# Patient Record
Sex: MALE | Race: BLACK OR AFRICAN AMERICAN | Employment: FULL TIME | URBAN - NONMETROPOLITAN AREA
[De-identification: names, ages, dates, MRNs, and addresses within clinical notes are randomized per-mention and may not be internally consistent; named-entity substitution may affect disease eponyms.]

---

## 2017-05-12 ENCOUNTER — NURSE TRIAGE (OUTPATIENT)
Dept: ADMINISTRATIVE | Age: 47
End: 2017-05-12

## 2017-07-17 ENCOUNTER — APPOINTMENT (OUTPATIENT)
Dept: CT IMAGING | Age: 47
End: 2017-07-17
Payer: COMMERCIAL

## 2017-07-17 ENCOUNTER — HOSPITAL ENCOUNTER (EMERGENCY)
Age: 47
Discharge: HOME | End: 2017-07-17
Payer: COMMERCIAL

## 2017-07-17 VITALS
HEART RATE: 79 BPM | BODY MASS INDEX: 43.65 KG/M2 | DIASTOLIC BLOOD PRESSURE: 66 MMHG | WEIGHT: 262 LBS | OXYGEN SATURATION: 100 % | RESPIRATION RATE: 18 BRPM | HEIGHT: 65 IN | SYSTOLIC BLOOD PRESSURE: 128 MMHG | TEMPERATURE: 98.7 F

## 2017-07-17 DIAGNOSIS — N20.0 RIGHT NEPHROLITHIASIS: Primary | ICD-10-CM

## 2017-07-17 LAB
ALBUMIN SERPL-MCNC: 4.3 G/DL (ref 3.5–5.1)
ALP BLD-CCNC: 62 U/L (ref 38–126)
ALT SERPL-CCNC: 43 U/L (ref 11–66)
AMYLASE: 203 U/L (ref 20–104)
ANION GAP SERPL CALCULATED.3IONS-SCNC: 12 MEQ/L (ref 8–16)
AST SERPL-CCNC: 21 U/L (ref 5–40)
BACTERIA: ABNORMAL /HPF
BASOPHILS # BLD: 0.4 %
BASOPHILS ABSOLUTE: 0 THOU/MM3 (ref 0–0.1)
BILIRUB SERPL-MCNC: 0.5 MG/DL (ref 0.3–1.2)
BILIRUBIN URINE: NEGATIVE
BLOOD, URINE: ABNORMAL
BUN BLDV-MCNC: 14 MG/DL (ref 7–22)
CALCIUM SERPL-MCNC: 9.2 MG/DL (ref 8.5–10.5)
CASTS 2: ABNORMAL /LPF
CASTS UA: ABNORMAL /LPF
CHARACTER, URINE: ABNORMAL
CHLORIDE BLD-SCNC: 103 MEQ/L (ref 98–111)
CO2: 25 MEQ/L (ref 23–33)
COLOR: ABNORMAL
CREAT SERPL-MCNC: 1.2 MG/DL (ref 0.4–1.2)
CRYSTALS, UA: ABNORMAL
EOSINOPHIL # BLD: 1.6 %
EOSINOPHILS ABSOLUTE: 0.1 THOU/MM3 (ref 0–0.4)
EPITHELIAL CELLS, UA: ABNORMAL /HPF
GFR SERPL CREATININE-BSD FRML MDRD: 78 ML/MIN/1.73M2
GLUCOSE BLD-MCNC: 132 MG/DL (ref 70–108)
GLUCOSE URINE: NEGATIVE MG/DL
HCT VFR BLD CALC: 46.8 % (ref 42–52)
HEMOGLOBIN: 15.8 GM/DL (ref 14–18)
KETONES, URINE: NEGATIVE
LEUKOCYTE ESTERASE, URINE: NEGATIVE
LIPASE: 28.5 U/L (ref 5.6–51.3)
LYMPHOCYTES # BLD: 40 %
LYMPHOCYTES ABSOLUTE: 2.2 THOU/MM3 (ref 1–4.8)
MCH RBC QN AUTO: 32.7 PG (ref 27–31)
MCHC RBC AUTO-ENTMCNC: 33.7 GM/DL (ref 33–37)
MCV RBC AUTO: 97.2 FL (ref 80–94)
MISCELLANEOUS 2: ABNORMAL
MONOCYTES # BLD: 8.5 %
MONOCYTES ABSOLUTE: 0.5 THOU/MM3 (ref 0.4–1.3)
NITRITE, URINE: NEGATIVE
NUCLEATED RED BLOOD CELLS: 0 /100 WBC
PDW BLD-RTO: 13.5 % (ref 11.5–14.5)
PH UA: 5.5
PLATELET # BLD: 317 THOU/MM3 (ref 130–400)
PMV BLD AUTO: 7.1 MCM (ref 7.4–10.4)
POTASSIUM SERPL-SCNC: 4.7 MEQ/L (ref 3.5–5.2)
PROTEIN UA: ABNORMAL
RBC # BLD: 4.82 MILL/MM3 (ref 4.7–6.1)
RBC # BLD: NORMAL 10*6/UL
RBC URINE: > 200 /HPF
RENAL EPITHELIAL, UA: ABNORMAL
SEG NEUTROPHILS: 49.5 %
SEGMENTED NEUTROPHILS ABSOLUTE COUNT: 2.8 THOU/MM3 (ref 1.8–7.7)
SODIUM BLD-SCNC: 140 MEQ/L (ref 135–145)
SPECIFIC GRAVITY, URINE: 1.02 (ref 1–1.03)
TOTAL PROTEIN: 7.6 G/DL (ref 6.1–8)
TROPONIN T: < 0.01 NG/ML
UROBILINOGEN, URINE: 0.2 EU/DL
WBC # BLD: 5.6 THOU/MM3 (ref 4.8–10.8)
WBC UA: ABNORMAL /HPF
YEAST: ABNORMAL

## 2017-07-17 PROCEDURE — 84484 ASSAY OF TROPONIN QUANT: CPT

## 2017-07-17 PROCEDURE — 74178 CT ABD&PLV WO CNTR FLWD CNTR: CPT

## 2017-07-17 PROCEDURE — 81001 URINALYSIS AUTO W/SCOPE: CPT

## 2017-07-17 PROCEDURE — 96375 TX/PRO/DX INJ NEW DRUG ADDON: CPT

## 2017-07-17 PROCEDURE — 80053 COMPREHEN METABOLIC PANEL: CPT

## 2017-07-17 PROCEDURE — 93005 ELECTROCARDIOGRAM TRACING: CPT

## 2017-07-17 PROCEDURE — 96374 THER/PROPH/DIAG INJ IV PUSH: CPT

## 2017-07-17 PROCEDURE — 85025 COMPLETE CBC W/AUTO DIFF WBC: CPT

## 2017-07-17 PROCEDURE — 6360000004 HC RX CONTRAST MEDICATION: Performed by: STUDENT IN AN ORGANIZED HEALTH CARE EDUCATION/TRAINING PROGRAM

## 2017-07-17 PROCEDURE — 6360000002 HC RX W HCPCS: Performed by: STUDENT IN AN ORGANIZED HEALTH CARE EDUCATION/TRAINING PROGRAM

## 2017-07-17 PROCEDURE — 2580000003 HC RX 258: Performed by: STUDENT IN AN ORGANIZED HEALTH CARE EDUCATION/TRAINING PROGRAM

## 2017-07-17 PROCEDURE — 36415 COLL VENOUS BLD VENIPUNCTURE: CPT

## 2017-07-17 PROCEDURE — 99284 EMERGENCY DEPT VISIT MOD MDM: CPT

## 2017-07-17 PROCEDURE — 83690 ASSAY OF LIPASE: CPT

## 2017-07-17 PROCEDURE — 82150 ASSAY OF AMYLASE: CPT

## 2017-07-17 RX ORDER — ONDANSETRON 2 MG/ML
4 INJECTION INTRAMUSCULAR; INTRAVENOUS ONCE
Status: COMPLETED | OUTPATIENT
Start: 2017-07-17 | End: 2017-07-17

## 2017-07-17 RX ORDER — 0.9 % SODIUM CHLORIDE 0.9 %
1000 INTRAVENOUS SOLUTION INTRAVENOUS ONCE
Status: COMPLETED | OUTPATIENT
Start: 2017-07-17 | End: 2017-07-17

## 2017-07-17 RX ORDER — KETOROLAC TROMETHAMINE 30 MG/ML
30 INJECTION, SOLUTION INTRAMUSCULAR; INTRAVENOUS ONCE
Status: COMPLETED | OUTPATIENT
Start: 2017-07-17 | End: 2017-07-17

## 2017-07-17 RX ORDER — HYDROCODONE BITARTRATE AND ACETAMINOPHEN 5; 325 MG/1; MG/1
1 TABLET ORAL EVERY 6 HOURS PRN
Qty: 5 TABLET | Refills: 0 | Status: SHIPPED | OUTPATIENT
Start: 2017-07-17 | End: 2017-07-21

## 2017-07-17 RX ADMIN — IOPAMIDOL 85 ML: 755 INJECTION, SOLUTION INTRAVENOUS at 17:22

## 2017-07-17 RX ADMIN — KETOROLAC TROMETHAMINE 30 MG: 30 INJECTION, SOLUTION INTRAMUSCULAR at 17:45

## 2017-07-17 RX ADMIN — SODIUM CHLORIDE 1000 ML: 9 INJECTION, SOLUTION INTRAVENOUS at 17:46

## 2017-07-17 RX ADMIN — ONDANSETRON 4 MG: 2 INJECTION INTRAMUSCULAR; INTRAVENOUS at 17:45

## 2017-07-17 ASSESSMENT — ENCOUNTER SYMPTOMS
VOMITING: 1
EYE REDNESS: 0
WHEEZING: 0
BACK PAIN: 1
DIARRHEA: 0
EYE DISCHARGE: 0
SORE THROAT: 0
COUGH: 0
SHORTNESS OF BREATH: 0
NAUSEA: 1
ABDOMINAL PAIN: 1
RHINORRHEA: 0

## 2017-07-17 ASSESSMENT — PAIN DESCRIPTION - ORIENTATION
ORIENTATION: RIGHT;UPPER
ORIENTATION: RIGHT;UPPER

## 2017-07-17 ASSESSMENT — PAIN SCALES - GENERAL
PAINLEVEL_OUTOF10: 10
PAINLEVEL_OUTOF10: 10
PAINLEVEL_OUTOF10: 0

## 2017-07-17 ASSESSMENT — PAIN DESCRIPTION - DESCRIPTORS
DESCRIPTORS: SHARP
DESCRIPTORS: SHARP

## 2017-07-17 ASSESSMENT — PAIN DESCRIPTION - FREQUENCY
FREQUENCY: CONTINUOUS
FREQUENCY: CONTINUOUS

## 2017-07-17 ASSESSMENT — PAIN DESCRIPTION - PAIN TYPE
TYPE: ACUTE PAIN
TYPE: ACUTE PAIN

## 2017-07-17 ASSESSMENT — PAIN DESCRIPTION - LOCATION
LOCATION: ABDOMEN
LOCATION: ABDOMEN

## 2017-07-18 ENCOUNTER — OFFICE VISIT (OUTPATIENT)
Dept: UROLOGY | Age: 47
End: 2017-07-18
Payer: COMMERCIAL

## 2017-07-18 ENCOUNTER — TELEPHONE (OUTPATIENT)
Dept: UROLOGY | Age: 47
End: 2017-07-18

## 2017-07-18 VITALS
BODY MASS INDEX: 43.71 KG/M2 | SYSTOLIC BLOOD PRESSURE: 118 MMHG | WEIGHT: 256 LBS | HEIGHT: 64 IN | DIASTOLIC BLOOD PRESSURE: 78 MMHG

## 2017-07-18 DIAGNOSIS — N20.1 RIGHT URETERAL STONE: Primary | ICD-10-CM

## 2017-07-18 DIAGNOSIS — N13.30 HYDRONEPHROSIS, RIGHT: ICD-10-CM

## 2017-07-18 PROCEDURE — 99203 OFFICE O/P NEW LOW 30 MIN: CPT | Performed by: NURSE PRACTITIONER

## 2017-07-18 RX ORDER — TAMSULOSIN HYDROCHLORIDE 0.4 MG/1
0.4 CAPSULE ORAL DAILY
Qty: 30 CAPSULE | Refills: 3 | Status: SHIPPED | OUTPATIENT
Start: 2017-07-18 | End: 2020-01-14

## 2017-07-18 RX ORDER — KETOROLAC TROMETHAMINE 10 MG/1
10 TABLET, FILM COATED ORAL EVERY 6 HOURS PRN
Qty: 20 TABLET | Refills: 0 | Status: SHIPPED | OUTPATIENT
Start: 2017-07-18 | End: 2020-01-14

## 2017-07-18 RX ORDER — HYDROCODONE BITARTRATE AND ACETAMINOPHEN 5; 325 MG/1; MG/1
1 TABLET ORAL EVERY 4 HOURS PRN
Qty: 15 TABLET | Refills: 0 | Status: SHIPPED | OUTPATIENT
Start: 2017-07-18 | End: 2017-07-21

## 2017-07-19 LAB
EKG ATRIAL RATE: 75 BPM
EKG P AXIS: 57 DEGREES
EKG P-R INTERVAL: 154 MS
EKG Q-T INTERVAL: 382 MS
EKG QRS DURATION: 84 MS
EKG QTC CALCULATION (BAZETT): 426 MS
EKG R AXIS: -21 DEGREES
EKG T AXIS: 14 DEGREES
EKG VENTRICULAR RATE: 75 BPM

## 2017-07-20 ENCOUNTER — NURSE TRIAGE (OUTPATIENT)
Dept: ADMINISTRATIVE | Age: 47
End: 2017-07-20

## 2017-07-20 ENCOUNTER — HOSPITAL ENCOUNTER (EMERGENCY)
Age: 47
Discharge: HOME | End: 2017-07-20
Attending: EMERGENCY MEDICINE
Payer: COMMERCIAL

## 2017-07-20 ENCOUNTER — TELEPHONE (OUTPATIENT)
Dept: UROLOGY | Age: 47
End: 2017-07-20

## 2017-07-20 VITALS
HEIGHT: 65 IN | HEART RATE: 78 BPM | BODY MASS INDEX: 42.99 KG/M2 | SYSTOLIC BLOOD PRESSURE: 156 MMHG | OXYGEN SATURATION: 95 % | RESPIRATION RATE: 20 BRPM | TEMPERATURE: 98.2 F | WEIGHT: 258 LBS | DIASTOLIC BLOOD PRESSURE: 85 MMHG

## 2017-07-20 DIAGNOSIS — N20.0 KIDNEY STONE: ICD-10-CM

## 2017-07-20 DIAGNOSIS — N23 RENAL COLIC: Primary | ICD-10-CM

## 2017-07-20 LAB
BACTERIA: ABNORMAL /HPF
BILIRUBIN URINE: NEGATIVE
BLOOD, URINE: ABNORMAL
CASTS 2: ABNORMAL /LPF
CASTS UA: ABNORMAL /LPF
CHARACTER, URINE: CLEAR
COLOR: YELLOW
CRYSTALS, UA: ABNORMAL
EPITHELIAL CELLS, UA: ABNORMAL /HPF
GLUCOSE URINE: NEGATIVE MG/DL
KETONES, URINE: NEGATIVE
LEUKOCYTE ESTERASE, URINE: NEGATIVE
MISCELLANEOUS 2: ABNORMAL
NITRITE, URINE: NEGATIVE
PH UA: 6
PROTEIN UA: NEGATIVE
RBC URINE: ABNORMAL /HPF
RENAL EPITHELIAL, UA: ABNORMAL
SPECIFIC GRAVITY, URINE: 1.01 (ref 1–1.03)
UROBILINOGEN, URINE: 0.2 EU/DL
WBC UA: ABNORMAL /HPF
YEAST: ABNORMAL

## 2017-07-20 PROCEDURE — 96375 TX/PRO/DX INJ NEW DRUG ADDON: CPT

## 2017-07-20 PROCEDURE — 99283 EMERGENCY DEPT VISIT LOW MDM: CPT

## 2017-07-20 PROCEDURE — 6360000002 HC RX W HCPCS: Performed by: EMERGENCY MEDICINE

## 2017-07-20 PROCEDURE — 96361 HYDRATE IV INFUSION ADD-ON: CPT

## 2017-07-20 PROCEDURE — 81001 URINALYSIS AUTO W/SCOPE: CPT

## 2017-07-20 PROCEDURE — 96374 THER/PROPH/DIAG INJ IV PUSH: CPT

## 2017-07-20 PROCEDURE — 2580000003 HC RX 258: Performed by: EMERGENCY MEDICINE

## 2017-07-20 RX ORDER — TIZANIDINE 4 MG/1
4 TABLET ORAL EVERY 6 HOURS PRN
COMMUNITY
End: 2020-01-14

## 2017-07-20 RX ORDER — ONDANSETRON 2 MG/ML
4 INJECTION INTRAMUSCULAR; INTRAVENOUS ONCE
Status: COMPLETED | OUTPATIENT
Start: 2017-07-20 | End: 2017-07-20

## 2017-07-20 RX ORDER — SIMVASTATIN 40 MG
40 TABLET ORAL NIGHTLY
COMMUNITY
End: 2020-01-14

## 2017-07-20 RX ORDER — SODIUM CHLORIDE 9 MG/ML
INJECTION, SOLUTION INTRAVENOUS CONTINUOUS
Status: DISCONTINUED | OUTPATIENT
Start: 2017-07-20 | End: 2017-07-20 | Stop reason: HOSPADM

## 2017-07-20 RX ORDER — KETOROLAC TROMETHAMINE 30 MG/ML
30 INJECTION, SOLUTION INTRAMUSCULAR; INTRAVENOUS ONCE
Status: COMPLETED | OUTPATIENT
Start: 2017-07-20 | End: 2017-07-20

## 2017-07-20 RX ADMIN — KETOROLAC TROMETHAMINE 30 MG: 30 INJECTION, SOLUTION INTRAMUSCULAR at 05:00

## 2017-07-20 RX ADMIN — SODIUM CHLORIDE: 9 INJECTION, SOLUTION INTRAVENOUS at 05:05

## 2017-07-20 RX ADMIN — ONDANSETRON 4 MG: 2 INJECTION INTRAMUSCULAR; INTRAVENOUS at 05:00

## 2017-07-20 ASSESSMENT — PAIN DESCRIPTION - PAIN TYPE
TYPE: ACUTE PAIN
TYPE: ACUTE PAIN

## 2017-07-20 ASSESSMENT — PAIN DESCRIPTION - PROGRESSION
CLINICAL_PROGRESSION: GRADUALLY IMPROVING
CLINICAL_PROGRESSION: GRADUALLY WORSENING

## 2017-07-20 ASSESSMENT — PAIN DESCRIPTION - FREQUENCY
FREQUENCY: INTERMITTENT
FREQUENCY: CONTINUOUS

## 2017-07-20 ASSESSMENT — PAIN DESCRIPTION - DESCRIPTORS
DESCRIPTORS: CRAMPING
DESCRIPTORS: SHARP

## 2017-07-20 ASSESSMENT — PAIN SCALES - GENERAL
PAINLEVEL_OUTOF10: 10
PAINLEVEL_OUTOF10: 3
PAINLEVEL_OUTOF10: 6

## 2017-07-20 ASSESSMENT — PAIN DESCRIPTION - ONSET: ONSET: ON-GOING

## 2017-07-20 ASSESSMENT — PAIN DESCRIPTION - LOCATION
LOCATION: ABDOMEN
LOCATION: ABDOMEN

## 2017-07-20 ASSESSMENT — PAIN DESCRIPTION - ORIENTATION
ORIENTATION: RIGHT
ORIENTATION: RIGHT

## 2017-07-21 ENCOUNTER — HOSPITAL ENCOUNTER (EMERGENCY)
Age: 47
Discharge: HOME | End: 2017-07-21
Attending: EMERGENCY MEDICINE
Payer: COMMERCIAL

## 2017-07-21 VITALS
HEIGHT: 65 IN | WEIGHT: 258 LBS | BODY MASS INDEX: 42.99 KG/M2 | OXYGEN SATURATION: 95 % | DIASTOLIC BLOOD PRESSURE: 71 MMHG | SYSTOLIC BLOOD PRESSURE: 133 MMHG | TEMPERATURE: 99.2 F | HEART RATE: 78 BPM | RESPIRATION RATE: 16 BRPM

## 2017-07-21 DIAGNOSIS — R19.7 DIARRHEA, UNSPECIFIED TYPE: Primary | ICD-10-CM

## 2017-07-21 DIAGNOSIS — N20.0 KIDNEY STONE: ICD-10-CM

## 2017-07-21 PROCEDURE — 82365 CALCULUS SPECTROSCOPY: CPT

## 2017-07-21 PROCEDURE — 99283 EMERGENCY DEPT VISIT LOW MDM: CPT

## 2017-07-21 RX ORDER — DIPHENOXYLATE HYDROCHLORIDE AND ATROPINE SULFATE 2.5; .025 MG/1; MG/1
1 TABLET ORAL 4 TIMES DAILY PRN
Qty: 12 TABLET | Refills: 0 | Status: SHIPPED | OUTPATIENT
Start: 2017-07-21 | End: 2020-01-14

## 2017-07-21 RX ORDER — ONDANSETRON 4 MG/1
4 TABLET, ORALLY DISINTEGRATING ORAL EVERY 8 HOURS PRN
Qty: 12 TABLET | Refills: 0 | Status: SHIPPED | OUTPATIENT
Start: 2017-07-21 | End: 2017-10-01 | Stop reason: ALTCHOICE

## 2017-07-26 LAB — STONE ANALYSIS: NORMAL

## 2017-09-28 ENCOUNTER — APPOINTMENT (OUTPATIENT)
Dept: GENERAL RADIOLOGY | Age: 47
End: 2017-09-28
Payer: COMMERCIAL

## 2017-09-28 ENCOUNTER — HOSPITAL ENCOUNTER (EMERGENCY)
Age: 47
Discharge: HOME | End: 2017-09-28
Payer: COMMERCIAL

## 2017-09-28 VITALS
RESPIRATION RATE: 18 BRPM | TEMPERATURE: 98.6 F | HEIGHT: 64 IN | WEIGHT: 258 LBS | DIASTOLIC BLOOD PRESSURE: 77 MMHG | SYSTOLIC BLOOD PRESSURE: 133 MMHG | OXYGEN SATURATION: 98 % | BODY MASS INDEX: 44.05 KG/M2 | HEART RATE: 84 BPM

## 2017-09-28 DIAGNOSIS — S61.511A LACERATION OF WRIST, RIGHT, INITIAL ENCOUNTER: Primary | ICD-10-CM

## 2017-09-28 PROCEDURE — 6360000002 HC RX W HCPCS: Performed by: PHYSICIAN ASSISTANT

## 2017-09-28 PROCEDURE — 99283 EMERGENCY DEPT VISIT LOW MDM: CPT

## 2017-09-28 PROCEDURE — A6446 CONFORM BAND S W>=3" <5"/YD: HCPCS

## 2017-09-28 PROCEDURE — 73130 X-RAY EXAM OF HAND: CPT

## 2017-09-28 PROCEDURE — 90471 IMMUNIZATION ADMIN: CPT | Performed by: PHYSICIAN ASSISTANT

## 2017-09-28 PROCEDURE — 73110 X-RAY EXAM OF WRIST: CPT

## 2017-09-28 PROCEDURE — 90715 TDAP VACCINE 7 YRS/> IM: CPT | Performed by: PHYSICIAN ASSISTANT

## 2017-09-28 RX ADMIN — TETANUS TOXOID, REDUCED DIPHTHERIA TOXOID AND ACELLULAR PERTUSSIS VACCINE, ADSORBED 0.5 ML: 5; 2.5; 8; 8; 2.5 SUSPENSION INTRAMUSCULAR at 11:01

## 2017-09-28 ASSESSMENT — ENCOUNTER SYMPTOMS
EYE DISCHARGE: 0
VOMITING: 0
SORE THROAT: 0
NAUSEA: 0
EYE REDNESS: 0
BACK PAIN: 0
RHINORRHEA: 0
WHEEZING: 0
SHORTNESS OF BREATH: 0
DIARRHEA: 0
ABDOMINAL PAIN: 0
COUGH: 0

## 2017-09-28 ASSESSMENT — PAIN DESCRIPTION - ORIENTATION: ORIENTATION: RIGHT

## 2017-09-28 ASSESSMENT — PAIN DESCRIPTION - PAIN TYPE: TYPE: ACUTE PAIN

## 2017-09-28 ASSESSMENT — PAIN SCALES - GENERAL: PAINLEVEL_OUTOF10: 2

## 2017-09-28 ASSESSMENT — PAIN DESCRIPTION - LOCATION: LOCATION: WRIST

## 2017-09-28 NOTE — ED PROVIDER NOTES
patient is not nervous/anxious. PAST MEDICAL HISTORY    has a past medical history of Asthma; High cholesterol; and Kidney stone. SURGICAL HISTORY      has no past surgical history on file. CURRENT MEDICATIONS       Discharge Medication List as of 9/28/2017 11:59 AM      CONTINUE these medications which have NOT CHANGED    Details   diphenoxylate-atropine (LOMOTIL) 2.5-0.025 MG per tablet Take 1 tablet by mouth 4 times daily as needed for Diarrhea, Disp-12 tablet, R-0Print      ondansetron (ZOFRAN ODT) 4 MG disintegrating tablet Take 1 tablet by mouth every 8 hours as needed for Nausea or Vomiting, Disp-12 tablet, R-0Print      simvastatin (ZOCOR) 40 MG tablet Take 40 mg by mouth nightlyHistorical Med      tiZANidine (ZANAFLEX) 4 MG tablet Take 4 mg by mouth every 6 hours as neededHistorical Med      Cholecalciferol (VITAMIN D PO) Take by mouthHistorical Med      tamsulosin (FLOMAX) 0.4 MG capsule Take 1 capsule by mouth daily, Disp-30 capsule, R-3Normal      ketorolac (TORADOL) 10 MG tablet Take 1 tablet by mouth every 6 hours as needed for Pain, Disp-20 tablet, R-0Normal      ibuprofen (ADVIL;MOTRIN) 800 MG tablet Take 1 tablet by mouth every 8 hours as needed for Pain, Disp-30 tablet, R-0      cetirizine (ZYRTEC) 10 MG tablet Take 10 mg by mouth as needed for Allergies             ALLERGIES     has No Known Allergies. FAMILY HISTORY     has no family status information on file. family history is not on file. SOCIAL HISTORY      reports that he is a non-smoker but has been exposed to tobacco smoke. He has never used smokeless tobacco. He reports that he does not drink alcohol or use illicit drugs. PHYSICAL EXAM     INITIAL VITALS:  height is 5' 4\" (1.626 m) and weight is 258 lb (117 kg). His oral temperature is 98.6 °F (37 °C). His blood pressure is 133/77 and his pulse is 84. His respiration is 18 and oxygen saturation is 98%.     Physical Exam   Constitutional: He is oriented to person, place, and time. He appears well-developed and well-nourished. HENT:   Head: Normocephalic and atraumatic. Right Ear: External ear normal.   Left Ear: External ear normal.   Nose: Nose normal.   Mouth/Throat: Oropharynx is clear and moist. No oropharyngeal exudate. Eyes: Conjunctivae are normal. Right eye exhibits no discharge. Left eye exhibits no discharge. No scleral icterus. Neck: Normal range of motion. Neck supple. No JVD present. Cardiovascular: Normal rate, regular rhythm, normal heart sounds and intact distal pulses. Exam reveals no gallop and no friction rub. No murmur heard. Pulmonary/Chest: Effort normal and breath sounds normal. No stridor. No respiratory distress. He has no decreased breath sounds. He has no wheezes. He has no rhonchi. He has no rales. He exhibits no tenderness. Abdominal: Soft. He exhibits no distension. There is no tenderness. There is no rebound and no guarding. Musculoskeletal: Normal range of motion. He exhibits no edema or deformity. Right elbow: Normal.       Right wrist: He exhibits laceration (medial aspect- linear and superficial). He exhibits normal range of motion, no bony tenderness, no swelling and no deformity. Right hand: Normal.   Neurological: He is alert and oriented to person, place, and time. No cranial nerve deficit. He exhibits normal muscle tone. He displays no seizure activity. Coordination normal. GCS eye subscore is 4. GCS verbal subscore is 5. GCS motor subscore is 6. Skin: Skin is warm and dry. No rash noted. He is not diaphoretic. No erythema. 2 1/2 cm superficial laceration on the ulnar side of the right wrist. No gaping or bleeding. Several sub half-centimeter very superficial cuts that are nonbleeding and nontender. Psychiatric: He has a normal mood and affect.  His speech is normal and behavior is normal. Thought content normal. Cognition and memory are normal.   Patient is talking appropriately    Nursing note

## 2017-09-28 NOTE — ED AVS SNAPSHOT
After Visit Summary  (Discharge Instructions)    Medication List for Home    Based on the information you provided to us as well as any changes during this visit, the following is your updated medication list.  Compare this with your prescription bottles at home. If you have any questions or concerns, contact your primary care physician's office. Daily Medication List (This medication list can be shared with any Healthcare provider who is helping you manage your medications)      These are medications you told us you were taking at home, CONTINUE taking them after you leave the hospital     cetirizine 10 MG tablet   Commonly known as:  ZYRTEC   Take 10 mg by mouth as needed for Allergies       diphenoxylate-atropine 2.5-0.025 MG per tablet   Commonly known as:  LOMOTIL   Take 1 tablet by mouth 4 times daily as needed for Diarrhea       ibuprofen 800 MG tablet   Commonly known as:  ADVIL;MOTRIN   Take 1 tablet by mouth every 8 hours as needed for Pain       ketorolac 10 MG tablet   Commonly known as:  TORADOL   Take 1 tablet by mouth every 6 hours as needed for Pain       ondansetron 4 MG disintegrating tablet   Commonly known as:  ZOFRAN ODT   Take 1 tablet by mouth every 8 hours as needed for Nausea or Vomiting       simvastatin 40 MG tablet   Commonly known as:  ZOCOR   Take 40 mg by mouth nightly       tamsulosin 0.4 MG capsule   Commonly known as:  FLOMAX   Take 1 capsule by mouth daily       tiZANidine 4 MG tablet   Commonly known as:  ZANAFLEX   Take 4 mg by mouth every 6 hours as needed       VITAMIN D PO   Take by mouth               Allergies as of 9/28/2017     No Known Allergies      Immunizations as of 9/28/2017     Name Date Dose VIS Date Route    Tdap (Boostrix, Adacel) 9/28/2017 0.5 mL 2/24/2015 Intramuscular         After Visit Summary    This summary was created for you. Thank you for entrusting your care to us.   The following information includes details about your hospital/visit stay along with steps you should take to help with your recovery once you leave the hospital.  In this packet, you will find information about the topics listed below:    · Instructions about your medications including a list of your home medications  · A summary of your hospital visit  · Follow-up appointments once you have left the hospital  · Your care plan at home      You may receive a survey regarding the care you received during your stay. Your input is valuable to us. We encourage you to complete and return your survey in the envelope provided. We hope you will choose us in the future for your healthcare needs. Patient Information     Patient Name SHIRA Sands 1970      Care Provided at:     Name Address Phone       [address] [phone]            Your Visit    Here you will find information about your visit, including the reason for your visit. Please take this sheet with you when you visit your doctor or other health care provider in the future. It will help determine the best possible medical care for you at that time. If you have any questions once you leave the hospital, please call the department phone number listed below. Diagnoses this visit     Your diagnosis was LACERATION OF WRIST, RIGHT, INITIAL ENCOUNTER. Visit Information     Date of Visit Department Dept Phone    2017 [facility] EMERGENCY DEPT [phone]      You were seen by     You were seen by RANDI Brody. Follow-up Appointments    Below is a list of your follow-up and future appointments. This may not be a complete list as you may have made appointments directly with providers that we are not aware of or your providers may have made some for you. Please call your providers to confirm appointments. It is important to keep your appointments. Please bring your current insurance card, photo ID, co-pay, and all medication bottles to your appointment. If self-pay, payment is expected at the time of service. Follow-up Information     Follow up with Buzz Peng In 2 days. Why:  For wound re-check    Contact information:    [address]          Follow up with [facility] [address] [address] EMERGENCY DEPT. Specialty:  Emergency Medicine    Why:  As needed, If symptoms worsen    Contact information:    Matt Becker  [address]  [phone]      Future Appointments     10/5/2017 9:30 AM     Appointment with Mele Graves MD at [phone] ([phone])   [place] [address]  [address]  [place] [address]         Preventive Care        Date Due    HIV screening is recommended for all people regardless of risk factors  aged 15-65 years at least once (lifetime) who have never been HIV tested. 11/15/1984    Cholesterol Screening 11/15/2009    Diabetes Screening 11/15/2009    Yearly Flu Vaccine (1) 9/1/2017    Tetanus Combination Vaccine (2 - Td) 9/28/2027                 Care Plan Once You Return Home    This section includes instructions you will need to follow once you leave the hospital.  Your care team will discuss these with you, so you and those caring for you know how to best care for your health needs at home. This section may also include educational information about certain health topics that may be of help to you. Important Information if you smoke or are exposed to smoking       SMOKING: QUIT SMOKING. THIS IS THE MOST IMPORTANT ACTION YOU CAN TAKE TO IMPROVE YOUR CURRENT AND FUTURE HEALTH. Call the [address] [place] [address] at [place] NOW ([phone])    Smoking harms nonsmokers. When nonsmokers are around people who smoke, they absorb nicotine, carbon monoxide, and other ingredients of tobacco smoke. 5. Create a [employer] ID. This will be your [employer] login ID and cannot be changed, so think of one that is secure and easy to remember. 6. Create a [employer] password. You can change your password at any time. 7. Enter your Password Reset Question and Answer. This can be used at a later time if you forget your password. 8. Enter your e-mail address. You will receive e-mail notification when new information is available in [address] 9. Click Sign Up. You can now view your medical record. Additional Information  If you have questions, please contact the physician practice where you receive care. Remember, [employer] is NOT to be used for urgent needs. For medical emergencies, dial 911. For questions regarding your [employer] account call [phone]. If you have a clinical question, please call your doctor's office. View your information online  ? Review your current list of  medications, immunization, and allergies. ? Review your future test results online . ? Review your discharge instructions provided by your caregivers at discharge    Certain functionality such as prescription refills, scheduling appointments or sending messages to your provider are not activated if your provider does not use [employer] in his/her office    For questions regarding your [employer] account call [phone]. If you have a clinical question, please call your doctor's office. The information on all pages of the After Visit Summary has been reviewed with me, the patient and/or responsible adult, by my health care provider(s). I had the opportunity to ask questions regarding this information. I understand I should dispose of my armband safely at home to protect my health information. A complete copy of the After Visit Summary has been given to me, the patient and/or responsible adult.          Patient Signature/Responsible Adult: ___________________________________ times a day unless your doctor gives you different instructions. ¨ Don't use hydrogen peroxide or alcohol, which can slow healing. ¨ You may cover the cut with a thin layer of petroleum jelly and a nonstick bandage. ¨ If the doctor put a bandage over the cut, put on a new bandage after cleaning the cut or if the bandage gets wet or dirty. · Avoid any activity that could cause your cut to reopen. · Be safe with medicines. Read and follow all instructions on the label. ¨ If the doctor gave you a prescription medicine for pain, take it as prescribed. ¨ If you are not taking a prescription pain medicine, ask your doctor if you can take an over-the-counter medicine. If the cut is closed with stitches, staples, or Steri-Strips  · Follow the above instructions for open or closed cuts. · Do not remove the stitches or staples on your own. Your doctor will tell you when to come back to have the stitches or staples removed. · Leave Steri-Strips on until they fall off. If the cut is closed with a skin adhesive  · Follow the above instructions for open or closed cuts. · Leave the skin adhesive on your skin until it falls off on its own. This may take 5 to 10 days. · Do not scratch, rub, or pick at the adhesive. · Do not put the sticky part of a bandage directly on the adhesive. · Do not put any kind of ointment, cream, or lotion over the area. This can make the adhesive fall off too soon. Do not use hydrogen peroxide or alcohol, which can slow healing. When should you call for help? Call your doctor now or seek immediate medical care if:  · You have new pain, or your pain gets worse. · The skin near the cut is cold or pale or changes color. · You have tingling, weakness, or numbness near the cut. · The cut starts to bleed, and blood soaks through the bandage. Oozing small amounts of blood is normal.  · You have trouble moving the area near the cut.   · You have symptoms of infection, such as:

## 2017-10-01 ENCOUNTER — HOSPITAL ENCOUNTER (EMERGENCY)
Age: 47
Discharge: HOME | End: 2017-10-01
Attending: NURSE PRACTITIONER
Payer: COMMERCIAL

## 2017-10-01 VITALS
BODY MASS INDEX: 27.14 KG/M2 | WEIGHT: 159 LBS | DIASTOLIC BLOOD PRESSURE: 86 MMHG | SYSTOLIC BLOOD PRESSURE: 142 MMHG | HEIGHT: 64 IN | HEART RATE: 89 BPM | OXYGEN SATURATION: 96 % | RESPIRATION RATE: 18 BRPM | TEMPERATURE: 98.5 F

## 2017-10-01 DIAGNOSIS — J20.9 ACUTE BRONCHITIS WITH BRONCHOSPASM: Primary | ICD-10-CM

## 2017-10-01 PROCEDURE — 99212 OFFICE O/P EST SF 10 MIN: CPT

## 2017-10-01 PROCEDURE — 99202 OFFICE O/P NEW SF 15 MIN: CPT | Performed by: NURSE PRACTITIONER

## 2017-10-01 RX ORDER — ALBUTEROL SULFATE 90 UG/1
2 AEROSOL, METERED RESPIRATORY (INHALATION) EVERY 4 HOURS PRN
Qty: 1 INHALER | Refills: 0 | Status: SHIPPED | OUTPATIENT
Start: 2017-10-01 | End: 2021-05-12

## 2017-10-01 RX ORDER — METHYLPREDNISOLONE 4 MG/1
TABLET ORAL
Qty: 1 KIT | Refills: 0 | Status: SHIPPED | OUTPATIENT
Start: 2017-10-01 | End: 2017-10-07

## 2017-10-01 RX ORDER — DEXTROMETHORPHAN HYDROBROMIDE AND PROMETHAZINE HYDROCHLORIDE 15; 6.25 MG/5ML; MG/5ML
5 SYRUP ORAL NIGHTLY PRN
Qty: 120 ML | Refills: 0 | Status: SHIPPED | OUTPATIENT
Start: 2017-10-01 | End: 2017-10-08

## 2017-10-01 ASSESSMENT — ENCOUNTER SYMPTOMS
SINUS PRESSURE: 0
TROUBLE SWALLOWING: 0
ABDOMINAL PAIN: 0
CHEST TIGHTNESS: 1
SHORTNESS OF BREATH: 0
VOMITING: 0
NAUSEA: 0
VOICE CHANGE: 1
SORE THROAT: 1
COUGH: 1
WHEEZING: 1
DIARRHEA: 0

## 2017-10-01 NOTE — ED AVS SNAPSHOT
You can get these medications from any pharmacy     Bring a paper prescription for each of these medications     albuterol sulfate  (90 Base) MCG/ACT inhaler    methylPREDNISolone 4 MG tablet    promethazine-dextromethorphan 6.25-15 MG/5ML syrup               Allergies as of 10/1/2017     No Known Allergies      Immunizations as of 10/1/2017     Name Date Dose VIS Date Route    Tdap (Boostrix, Adacel) 2017 0.5 mL 2015 Intramuscular         After Visit Summary    This summary was created for you. Thank you for entrusting your care to us. The following information includes details about your hospital/visit stay along with steps you should take to help with your recovery once you leave the hospital.  In this packet, you will find information about the topics listed below:    · Instructions about your medications including a list of your home medications  · A summary of your hospital visit  · Follow-up appointments once you have left the hospital  · Your care plan at home      You may receive a survey regarding the care you received during your stay. Your input is valuable to us. We encourage you to complete and return your survey in the envelope provided. We hope you will choose us in the future for your healthcare needs. Patient Information     Patient Name SHIRA Sands 1970      Care Provided at:     Name Address Phone       [address] [phone]            Your Visit    Here you will find information about your visit, including the reason for your visit. Please take this sheet with you when you visit your doctor or other health care provider in the future. It will help determine the best possible medical care for you at that time. If you have any questions once you leave the hospital, please call the department phone number listed below.         Diagnoses this visit you are having a problem with your medicine. You will get more details on the specific medicine your doctor prescribes. · If your doctor prescribed antibiotics, take them as directed. Do not stop taking them just because you feel better. You need to take the full course of antibiotics. · Breathe moist air from a humidifier, hot shower, or sink filled with hot water. This may help ease your symptoms and make it easier for you to breathe. · If you have congestion in your nose and throat, drinking plenty of fluids, especially hot fluids, may help relieve your symptoms. If you have kidney, heart, or liver disease and have to limit fluids, talk with your doctor before you increase the amount of fluids you drink. · If you have mucus in your airways, it may help to breathe deeply and cough. · Do not smoke or allow others to smoke around you. Smoking can make your wheezing worse. If you need help quitting, talk to your doctor about stop-smoking programs and medicines. These can increase your chances of quitting for good. · Avoid things that may cause your wheezing. These may include colds, smoke, air pollution, dust, pollen, pets, cockroaches, stress, and cold air. When should you call for help? Call 911 anytime you think you may need emergency care. For example, call if:  · You have severe trouble breathing. · You passed out (lost consciousness). Call your doctor now or seek immediate medical care if:  · You cough up yellow, dark brown, or bloody mucus (sputum). · You have new or worse shortness of breath. · Your wheezing is not getting better or it gets worse after you start taking your medicine. Watch closely for changes in your health, and be sure to contact your doctor if:  · You do not get better as expected. Where can you learn more? Go to https://daniel.[employer]. org and sign in to your [employer] account.  Enter [id number] in the [employer] box to learn more about \"Wheezing or Bronchoconstriction: Care Instructions. \"     If you do not have an account, please click on the \"Sign Up Now\" link. Current as of: March 25, 2017  Content Version: 11.3  © 6335-9857 [employer]. Care instructions adapted under license by [facility] ([facility]). If you have questions about a medical condition or this instruction, always ask your healthcare professional. [address] any warranty or liability for your use of this information.

## 2017-10-01 NOTE — ED NOTES
An After Visit Summary was printed, reviewed with pt and given to. Pt given printed rx's for proair, promethazine DM, and medrol dose tavon.      Shagufta Bergeron LPN  19/07/19 [id number]

## 2017-10-01 NOTE — ED PROVIDER NOTES
[facility]  Urgent Care Encounter      CHIEF COMPLAINT       Chief Complaint   Patient presents with    Cough     non productive    Chest Congestion       Nurses Notes reviewed and I agree except as noted in the HPI. HISTORY OF PRESENT ILLNESS   Madison Singh is a 51 y.o. male who presents with chest congestion and cough. The cough kept him awake all night. He states his symptoms started on Wednesday with sore throat and congestion. He went to [address] [place] [address] and said he tested positive for strep they put him on Augmentin and gave him a prescription for ibuprofen. He has a raspy voice and persistent cough. He denies any fever, chills or any other symptoms. He is in no acute distress. REVIEW OF SYSTEMS     Review of Systems   Constitutional: Negative for chills and fever. HENT: Positive for congestion (resolving), sore throat (secondary to cough) and voice change (raspy). Negative for ear pain, postnasal drip, sinus pressure and trouble swallowing. Respiratory: Positive for cough, chest tightness and wheezing. Negative for shortness of breath. Cardiovascular: Negative for chest pain. Gastrointestinal: Negative for abdominal pain, diarrhea, nausea and vomiting. Musculoskeletal: Negative for myalgias. Neurological: Negative for headaches. PAST MEDICAL HISTORY         Diagnosis Date    Asthma     High cholesterol     Kidney stone        SURGICAL HISTORY     Patient  has no past surgical history on file.     CURRENT MEDICATIONS       Discharge Medication List as of 10/1/2017  2:44 PM      CONTINUE these medications which have NOT CHANGED    Details   simvastatin (ZOCOR) 40 MG tablet Take 40 mg by mouth nightlyHistorical Med      tiZANidine (ZANAFLEX) 4 MG tablet Take 4 mg by mouth every 6 hours as neededHistorical Med      Cholecalciferol (VITAMIN D PO) Take by mouthHistorical Med      tamsulosin (FLOMAX) 0.4 MG capsule Take 1 capsule by mouth daily, Disp-30 capsule, R-3Normal      ketorolac (TORADOL) 10 MG tablet Take 1 tablet by mouth every 6 hours as needed for Pain, Disp-20 tablet, R-0Normal      ibuprofen (ADVIL;MOTRIN) 800 MG tablet Take 1 tablet by mouth every 8 hours as needed for Pain, Disp-30 tablet, R-0      cetirizine (ZYRTEC) 10 MG tablet Take 10 mg by mouth as needed for Allergies      diphenoxylate-atropine (LOMOTIL) 2.5-0.025 MG per tablet Take 1 tablet by mouth 4 times daily as needed for Diarrhea, Disp-12 tablet, R-0Print             ALLERGIES     Patient is has No Known Allergies. FAMILY HISTORY     Patient's family history is not on file. SOCIAL HISTORY     Patient  reports that he is a non-smoker but has been exposed to tobacco smoke. He has never used smokeless tobacco. He reports that he does not drink alcohol or use illicit drugs. PHYSICAL EXAM     ED TRIAGE VITALS  BP: (!) 142/86, Temp: 98.5 °F (36.9 °C), Pulse: 89, Resp: 18, SpO2: 96 %  Physical Exam   Constitutional: He appears well-developed and well-nourished. No distress. HENT:   Head: Normocephalic and atraumatic. Right Ear: External ear normal. A middle ear effusion is present. Left Ear: External ear normal. A middle ear effusion is present. Nose: Mucosal edema present. Right sinus exhibits no maxillary sinus tenderness and no frontal sinus tenderness. Left sinus exhibits no maxillary sinus tenderness and no frontal sinus tenderness. Mouth/Throat: Uvula is midline and mucous membranes are normal. Posterior oropharyngeal edema and posterior oropharyngeal erythema (minimal diffuse) present. No oropharyngeal exudate or tonsillar abscesses. Eyes: Conjunctivae are normal.   Neck: Neck supple. Cardiovascular: Normal rate, regular rhythm and normal heart sounds. Exam reveals no gallop and no friction rub. No murmur heard. Pulmonary/Chest: Effort normal. No accessory muscle usage. No respiratory distress.  He has decreased breath sounds (bilaterally). He has wheezes (end inspiratory bilaterally). He has no rhonchi. He has no rales. Lymphadenopathy:     He has cervical adenopathy (tenderness). Neurological: He is alert. Skin: Skin is warm and dry. Psychiatric: He has a normal mood and affect. Nursing note and vitals reviewed. DIAGNOSTIC RESULTS   Labs:No results found for this visit on 10/01/17. IMAGING:    URGENT CARE COURSE:     Vitals:    10/01/17 1408   BP: (!) 142/86   Pulse: 89   Resp: 18   Temp: 98.5 °F (36.9 °C)   TempSrc: Oral   SpO2: 96%   Weight: 159 lb (72.1 kg)   Height: 5' 4\" (1.626 m)       Medications - No data to display  PROCEDURES:  None  FINAL IMPRESSION      1. Acute bronchitis with bronchospasm        DISPOSITION/PLAN   DISPOSITION Decision to Discharge   Patient presented with worsening cough and wheezing. He was currently being treated by primary care with Augmentin for strep throat. I told him to continue the Augmentin. I'm going to add an albuterol HFA a steroid Dosepak as directed and some Promethazine DM for bedtime cough. He should follow-up with his primary care provider if symptoms aren't improving or any other concerns.   PATIENT REFERRED TO:  Ace [address]  [phone]    Schedule an appointment as soon as possible for a visit  If not improving    DISCHARGE MEDICATIONS:  Discharge Medication List as of 10/1/2017  2:44 PM      START taking these medications    Details   albuterol sulfate HFA (PROAIR HFA) 108 (90 Base) MCG/ACT inhaler Inhale 2 puffs into the lungs every 4 hours as needed for Wheezing, Disp-1 Inhaler, R-0Print      methylPREDNISolone (MEDROL, AISHWARYA,) 4 MG tablet Take by mouth., Disp-1 kit, R-0Print      promethazine-dextromethorphan (PROMETHAZINE-DM) 6.25-15 MG/5ML syrup Take 5 mLs by mouth nightly as needed for Cough, Disp-120 mL, R-0Print           Discharge Medication List as of 10/1/2017  2:44 PM          Cinda 8924, CNP           [address],

## 2017-10-05 ENCOUNTER — INITIAL CONSULT (OUTPATIENT)
Dept: PULMONOLOGY | Age: 47
End: 2017-10-05
Payer: COMMERCIAL

## 2017-10-05 VITALS
HEART RATE: 74 BPM | BODY MASS INDEX: 42.99 KG/M2 | DIASTOLIC BLOOD PRESSURE: 74 MMHG | WEIGHT: 258 LBS | SYSTOLIC BLOOD PRESSURE: 132 MMHG | HEIGHT: 65 IN | OXYGEN SATURATION: 96 %

## 2017-10-05 DIAGNOSIS — Z99.89 OSA ON CPAP: Primary | ICD-10-CM

## 2017-10-05 DIAGNOSIS — G47.21 CIRCADIAN RHYTHM SLEEP DISORDER, DELAYED SLEEP PHASE TYPE: ICD-10-CM

## 2017-10-05 DIAGNOSIS — G47.33 OSA ON CPAP: Primary | ICD-10-CM

## 2017-10-05 PROCEDURE — 99203 OFFICE O/P NEW LOW 30 MIN: CPT | Performed by: INTERNAL MEDICINE

## 2017-10-05 NOTE — PROGRESS NOTES
Chief Complaint: NP sleep consult, MICHAELA fpt Dr. Sally Chung, records are in epic, download    Mallampati airway Class:{Js # [id number]}  Neck Circumference:18.0 Inches    Hooker sleepiness score 10/5/17: 0. Diagnostic Data:   PAP Download: PSG - 10/12/15   AHI - 16.2  Recorded compliance dates: 6/24/17-9/21/17  More than 4hour usage compliance was: 20%.   Average residual Apnea- Hypoapnea index on current pressue was: 2.2      PAP Type CPAP   Level  11.0     Average usuage hours per day was: 4:22:00     Interface: Nasal    Provider:  [][facility]  [][employer]  [][employer]  [x][facility]         [][facility] []Other:

## 2017-10-05 NOTE — PATIENT INSTRUCTIONS
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 He was advised to start Chronotherapy to maintain desired sleep schedule.  -Patient will be given a prescription for chin strap to use with his current mask to avoid leaks and to improve his dryness of mouth.  -He was instructed to extend his sleep schedule to 7 to 9 hours in a given 24hour period continuously.  -At the request of patient, he was given prescription for CPAP supplies including a new mask to submit to DME company.  -He was advised to continue current positive airway pressure therapy with above described pressure.  -He advised to keep good compliance with current recommended pressure to get optimal results and clinical improvement.  -Follow with my clinic in 3 to 4 months with [facility] for clinical reevaluation with review of download. -He was advised to call [employer] regarding supplies if needed. -He was informed about the possibility of loosing his CPAP/BiPAP/AutoSV if he don't use it with good compliance for >4hours i.e >70%. He verbalizes understanding.  -He was advised to loose weight by controlling diet and doing exercise once cleared by his family physician.   -He was advised call my office for earlier appointment if needed for worsening of sleep symptoms.  -Serafin Keen educated about my impression and plan. Patient verbalizes understanding.

## 2017-10-05 NOTE — PROGRESS NOTES
Sleep Medicine Initial Consultation    Isaiah Adkins                                             Chief Complaint: NP sleep consult, MICHAELA fpt Dr. Luis Fernando Hassan, records are in epic, download    Isaiah Adkins is a 51 y. o.oldmale came for further evaluation regarding his sleep apnea  with referral from self referral. He usually goes to bed at 2:00 AM and wakes up at  10:00 AM to 11:00AM. Over the weekends his sleep schedule remain same. He denies taking naps. She used to follow with Dr.Joseph Latasha MD in the past at [facility]. He switched his care to me. He usually falls a sleep in <10 to 20 minutes. He admits to watching Television in his bed room some times. He denies reading books in his bed room. He admits to working with his electronic devices in his bed room before going to sleep some times. He denies any difficulty in going to sleep. He wakes up 1 times during night. Majority of nocturnal awakenings are for urination. He denies any difficulty in falling back to sleep after nocturnal awkenings. He was diagnosed with moderately severe obstructive sleep apnea in 2015 and was prescribed with a CPAP machine with a pressure of 11 cm H20. He denies any problems with his current machine or mask. He is currently using a nasal mask. He is having poor compliance to CPAP therapy due to his cold. He  admits to history of choking and gasping sensation at night time some times. He admits to headaches in the morning rarely. He admits to dry mouth in the morning. He denies palpitations during night time or during nocturnal awakenings. He admits to sweating during nocturnal awakenings. He denies history of head injury in the past. He denies motor vehicle accidents. He denies any history suggestive of hypnagogic or hypnopompic hallucinations. He denies any history of seizures and sleep walking. He denies any history of sleep talking. No history suggestive of bruxism.  No history (ZYRTEC) 10 MG tablet Take 10 mg by mouth as needed for Allergies       No current facility-administered medications for this visit. No family history on file. Review of Systems   General/Constitutional:He gained 2lbs of weight from his old sleep study in 2015 with normal appetite. No fever or chills. HENT: Negative. Eyes: Negative. Upper respiratory tract: No nasal stuffiness or post nasal drip. Lower respiratory tract/ lungs: Occasional cough with no sputum production. No hemoptysis. Cardiovascular: No palpitations or chest pain. Gastrointestinal: No nausea or vomiting. Neurological: No focal neurologiacal weakness. Extremities: No edema. Musculoskeletal: No complaints. Genitourinary: No complaints. Hematological: Negative. Psychiatric/Behavioral: Negative. Skin: No itching. /74  Pulse 74  Ht 5' 4.5\" (1.638 m)  Wt 258 lb (117 kg)  SpO2 96% Comment: RA at rest  BMI 43.6 kg/m2  Mallampati airway Class:IV  Neck Circumference:18.0 Inches  Luana sleepiness score 10/5/17: 0. Physical Exam   Nursing note and vitals reviewed. Constitutional: Patient appears well built and well nourished. No distress. Patient is oriented to person, place, and time. HENT:   Head: Normocephalic and atraumatic. Right Ear: External ear normal.   Left Ear: External ear normal.   Mouth/Throat: Oropharynx is clear and moist.  No oral thrush. Eyes: Conjunctivae are normal. Pupils are equal, round, and reactive to light. No scleral icterus. Neck: Neck supple. No JVD present. No tracheal deviation present. Cardiovascular: Normal rate, regular rhythm, normal heart sounds. No murmur heard. Pulmonary/Chest: Effort normal and breath sounds normal. No stridor. No respiratory distress. No wheezes. No rales. Patient exhibits no tenderness. Abdominal: Soft. Patient exhibits no distension. No tenderness. Musculoskeletal: Normal range of motion.    Extremities: Patient exhibits no edema and no tenderness. Lymphadenopathy:  No cervical adenopathy. Neurological: Patient is alert and oriented to person, place, and time. Skin: Skin is warm and dry. Patient is not diaphoretic. Psychiatric: Patient  has a normal mood and affect. Patient behavior is normal.     Diagnostic Data:    Sleep study:  PATIENT NAME: Bertha Eden          :  1970  MEDICAL RECORD NO. [id number]               ROOM:   ACCOUNT NO: [de-identified]                        DATE: 10/12/2015  PHYSICIAN: KENDELL Peterson M.D. The patient is a 25-year-old male with height 64 inches, weight 256 pounds,  CONCLUSION:      1. Moderate to severe obstructive sleep apnea. RECOMMENDATION:  At this point would be to proceed with C-PAP titration as  soon as possible and thank you for giving me the opportunity to interpret  this polysomnography. I have reviewed the raw data of the above sleep study epoch by epoch and  interpreted. CPAP study:  PATIENT NAME: Bertha Eden          :  1970  MEDICAL RECORD NO. [id number]               ROOM:   ACCOUNT NO: [de-identified]                        DATE: 2015  PHYSICIAN: KENDELL Pagan M.D.          POLYSOMNOGRAPHY TEST WITH CPAP TITRATION:      The patient is a 71year old male. Height 64 inches. Weight 256 pounds with  body mass index of 43.9. He was referred from [facility]. CONCLUSION:      1. Optimal C-PAP pressure of 11 cm of water      RECOMMENDATIONS: At this point will be to provide patient with C-PAP machine  with above pressure and added heat humidification. PAP Download: PSG - 10/12/15   AHI - 16.2  Recorded compliance dates: 17-17  More than 4hour usage compliance was: 20%.   Average residual Apnea- Hypoapnea index on current pressue was: 2.2       PAP Type CPAP   Level  11.0      Average usuage hours per day was: 4:22:00                        Interface: Nasal     Provider:  [][facility]

## 2017-10-05 NOTE — MR AVS SNAPSHOT
Madison Singh educated about my impression and plan. Patient verbalizes understanding. Medications and Orders      Your Current Medications Are              albuterol sulfate HFA (PROAIR HFA) 108 (90 Base) MCG/ACT inhaler Inhale 2 puffs into the lungs every 4 hours as needed for Wheezing    methylPREDNISolone (MEDROL, AISHWARYA,) 4 MG tablet Take by mouth.    promethazine-dextromethorphan (PROMETHAZINE-DM) 6.25-15 MG/5ML syrup Take 5 mLs by mouth nightly as needed for Cough    diphenoxylate-atropine (LOMOTIL) 2.5-0.025 MG per tablet Take 1 tablet by mouth 4 times daily as needed for Diarrhea    simvastatin (ZOCOR) 40 MG tablet Take 40 mg by mouth nightly    tiZANidine (ZANAFLEX) 4 MG tablet Take 4 mg by mouth every 6 hours as needed    Cholecalciferol (VITAMIN D PO) Take by mouth    tamsulosin (FLOMAX) 0.4 MG capsule Take 1 capsule by mouth daily    ketorolac (TORADOL) 10 MG tablet Take 1 tablet by mouth every 6 hours as needed for Pain    ibuprofen (ADVIL;MOTRIN) 800 MG tablet Take 1 tablet by mouth every 8 hours as needed for Pain    cetirizine (ZYRTEC) 10 MG tablet Take 10 mg by mouth as needed for Allergies      Allergies           No Known Allergies      We Ordered/Performed the following           DME Order for CPAP as OP     Comments:    Please do Mask refit with a Nasal/Full face mask of patient choice and issue a new mask. He  needs a chin strap to use with a nasal mask to avoid dryness of mouth. Comments: Mask refit  Diagnosis: MICHAELA on CPAP  Length of need: 18 Months    DME Order for CPAP as OP     Comments:    Heated Humidifier   Nasal Mask 1 per 3 months and Cushions/Seals 2 per month. Please provide chin strap to use with his current mask to avoid leaks and to improve his dryness of mouth.   Headgear 1 per 6 months, Chin Strap 1 per 6 months, Disposable Filters 2 per month, Non-disposable Filters 1 per 6 months, Tubing 1 per 3 months,

## 2018-04-04 ENCOUNTER — TELEPHONE (OUTPATIENT)
Dept: PULMONOLOGY | Age: 48
End: 2018-04-04

## 2018-05-11 ENCOUNTER — NURSE TRIAGE (OUTPATIENT)
Dept: ADMINISTRATIVE | Age: 48
End: 2018-05-11

## 2018-05-20 ENCOUNTER — NURSE TRIAGE (OUTPATIENT)
Dept: ADMINISTRATIVE | Age: 48
End: 2018-05-20

## 2019-02-23 ENCOUNTER — APPOINTMENT (OUTPATIENT)
Dept: CT IMAGING | Age: 49
End: 2019-02-23
Payer: MEDICAID

## 2019-02-23 ENCOUNTER — HOSPITAL ENCOUNTER (EMERGENCY)
Age: 49
Discharge: HOME | End: 2019-02-23
Attending: FAMILY MEDICINE
Payer: MEDICAID

## 2019-02-23 VITALS
WEIGHT: 250 LBS | TEMPERATURE: 98.5 F | DIASTOLIC BLOOD PRESSURE: 81 MMHG | SYSTOLIC BLOOD PRESSURE: 127 MMHG | OXYGEN SATURATION: 100 % | BODY MASS INDEX: 41.65 KG/M2 | HEIGHT: 65 IN | HEART RATE: 78 BPM | RESPIRATION RATE: 18 BRPM

## 2019-02-23 DIAGNOSIS — H20.9 IRITIS: Primary | ICD-10-CM

## 2019-02-23 DIAGNOSIS — R07.9 CHEST PAIN, UNSPECIFIED TYPE: ICD-10-CM

## 2019-02-23 LAB
EKG ATRIAL RATE: 64 BPM
EKG P AXIS: 74 DEGREES
EKG P-R INTERVAL: 160 MS
EKG Q-T INTERVAL: 400 MS
EKG QRS DURATION: 78 MS
EKG QTC CALCULATION (BAZETT): 412 MS
EKG R AXIS: 1 DEGREES
EKG T AXIS: 48 DEGREES
EKG VENTRICULAR RATE: 64 BPM
REASON FOR REJECTION: NORMAL
REJECTED TEST: NORMAL
TROPONIN T: < 0.01 NG/ML

## 2019-02-23 PROCEDURE — 84484 ASSAY OF TROPONIN QUANT: CPT

## 2019-02-23 PROCEDURE — 93005 ELECTROCARDIOGRAM TRACING: CPT | Performed by: FAMILY MEDICINE

## 2019-02-23 PROCEDURE — 99284 EMERGENCY DEPT VISIT MOD MDM: CPT

## 2019-02-23 PROCEDURE — 70450 CT HEAD/BRAIN W/O DYE: CPT

## 2019-02-23 PROCEDURE — 36415 COLL VENOUS BLD VENIPUNCTURE: CPT

## 2019-02-23 RX ORDER — PREDNISOLONE ACETATE 10 MG/ML
1 SUSPENSION/ DROPS OPHTHALMIC 4 TIMES DAILY
Qty: 20 DROP | Refills: 0 | Status: SHIPPED | OUTPATIENT
Start: 2019-02-23 | End: 2019-02-28

## 2019-02-23 ASSESSMENT — PAIN DESCRIPTION - ORIENTATION: ORIENTATION: LEFT

## 2019-02-23 ASSESSMENT — ENCOUNTER SYMPTOMS
EYE REDNESS: 0
RHINORRHEA: 0
EYE PAIN: 1
ABDOMINAL PAIN: 0
EYE DISCHARGE: 0
NAUSEA: 0
SORE THROAT: 0
WHEEZING: 0
BACK PAIN: 0
PHOTOPHOBIA: 1
COUGH: 0
SHORTNESS OF BREATH: 0
DIARRHEA: 0
VOMITING: 0

## 2019-02-23 ASSESSMENT — PAIN DESCRIPTION - LOCATION: LOCATION: EYE

## 2019-02-23 ASSESSMENT — HEART SCORE: ECG: 0

## 2019-02-23 ASSESSMENT — VISUAL ACUITY
OU: 20/20
OS: 20/20
OD: 20/20

## 2019-02-23 ASSESSMENT — PAIN SCALES - GENERAL: PAINLEVEL_OUTOF10: 3

## 2019-02-24 PROCEDURE — 93010 ELECTROCARDIOGRAM REPORT: CPT | Performed by: INTERNAL MEDICINE

## 2020-01-14 ENCOUNTER — NURSE ONLY (OUTPATIENT)
Dept: LAB | Age: 50
End: 2020-01-14

## 2020-01-14 ENCOUNTER — OFFICE VISIT (OUTPATIENT)
Dept: FAMILY MEDICINE CLINIC | Age: 50
End: 2020-01-14
Payer: MEDICAID

## 2020-01-14 VITALS
WEIGHT: 259.2 LBS | HEART RATE: 72 BPM | HEIGHT: 65 IN | TEMPERATURE: 98.1 F | DIASTOLIC BLOOD PRESSURE: 78 MMHG | RESPIRATION RATE: 16 BRPM | SYSTOLIC BLOOD PRESSURE: 122 MMHG | BODY MASS INDEX: 43.18 KG/M2

## 2020-01-14 LAB
ALBUMIN SERPL-MCNC: 4.6 G/DL (ref 3.5–5.1)
ALP BLD-CCNC: 60 U/L (ref 38–126)
ALT SERPL-CCNC: 38 U/L (ref 11–66)
ANION GAP SERPL CALCULATED.3IONS-SCNC: 13 MEQ/L (ref 8–16)
AST SERPL-CCNC: 22 U/L (ref 5–40)
BASOPHILS # BLD: 0.6 %
BASOPHILS ABSOLUTE: 0 THOU/MM3 (ref 0–0.1)
BILIRUB SERPL-MCNC: 0.4 MG/DL (ref 0.3–1.2)
BILIRUBIN DIRECT: < 0.2 MG/DL (ref 0–0.3)
BUN BLDV-MCNC: 13 MG/DL (ref 7–22)
CALCIUM SERPL-MCNC: 10.1 MG/DL (ref 8.5–10.5)
CHLORIDE BLD-SCNC: 103 MEQ/L (ref 98–111)
CHOLESTEROL, TOTAL: 309 MG/DL (ref 100–199)
CO2: 27 MEQ/L (ref 23–33)
CREAT SERPL-MCNC: 1 MG/DL (ref 0.4–1.2)
EOSINOPHIL # BLD: 3.2 %
EOSINOPHILS ABSOLUTE: 0.2 THOU/MM3 (ref 0–0.4)
ERYTHROCYTE [DISTWIDTH] IN BLOOD BY AUTOMATED COUNT: 12.1 % (ref 11.5–14.5)
ERYTHROCYTE [DISTWIDTH] IN BLOOD BY AUTOMATED COUNT: 45.3 FL (ref 35–45)
GFR SERPL CREATININE-BSD FRML MDRD: 79 ML/MIN/1.73M2
GLUCOSE BLD-MCNC: 94 MG/DL (ref 70–108)
HCT VFR BLD CALC: 48.7 % (ref 42–52)
HDLC SERPL-MCNC: 43 MG/DL
HEMOGLOBIN: 15.5 GM/DL (ref 14–18)
IMMATURE GRANS (ABS): 0.02 THOU/MM3 (ref 0–0.07)
IMMATURE GRANULOCYTES: 0.4 %
LDL CHOLESTEROL CALCULATED: 221 MG/DL
LYMPHOCYTES # BLD: 48.6 %
LYMPHOCYTES ABSOLUTE: 2.3 THOU/MM3 (ref 1–4.8)
MCH RBC QN AUTO: 32.2 PG (ref 26–33)
MCHC RBC AUTO-ENTMCNC: 31.8 GM/DL (ref 32.2–35.5)
MCV RBC AUTO: 101 FL (ref 80–94)
MONOCYTES # BLD: 10.7 %
MONOCYTES ABSOLUTE: 0.5 THOU/MM3 (ref 0.4–1.3)
NUCLEATED RED BLOOD CELLS: 0 /100 WBC
PLATELET # BLD: 284 THOU/MM3 (ref 130–400)
PMV BLD AUTO: 8.8 FL (ref 9.4–12.4)
POTASSIUM SERPL-SCNC: 4.5 MEQ/L (ref 3.5–5.2)
RBC # BLD: 4.82 MILL/MM3 (ref 4.7–6.1)
SEG NEUTROPHILS: 36.5 %
SEGMENTED NEUTROPHILS ABSOLUTE COUNT: 1.8 THOU/MM3 (ref 1.8–7.7)
SODIUM BLD-SCNC: 143 MEQ/L (ref 135–145)
TOTAL PROTEIN: 7.7 G/DL (ref 6.1–8)
TRIGL SERPL-MCNC: 224 MG/DL (ref 0–199)
VITAMIN D 25-HYDROXY: 23 NG/ML (ref 30–100)
WBC # BLD: 4.8 THOU/MM3 (ref 4.8–10.8)

## 2020-01-14 PROCEDURE — 99386 PREV VISIT NEW AGE 40-64: CPT | Performed by: NURSE PRACTITIONER

## 2020-01-14 PROCEDURE — G8484 FLU IMMUNIZE NO ADMIN: HCPCS | Performed by: NURSE PRACTITIONER

## 2020-01-14 RX ORDER — MULTIVIT-MIN/IRON/FOLIC ACID/K 18-600-40
1 CAPSULE ORAL DAILY
Qty: 30 CAPSULE | Refills: 3 | Status: SHIPPED | OUTPATIENT
Start: 2020-01-14 | End: 2020-01-21 | Stop reason: SDUPTHER

## 2020-01-14 RX ORDER — OMEPRAZOLE 20 MG/1
20 CAPSULE, DELAYED RELEASE ORAL DAILY
Qty: 30 CAPSULE | Refills: 3 | Status: SHIPPED | OUTPATIENT
Start: 2020-01-14 | End: 2020-01-21 | Stop reason: SDUPTHER

## 2020-01-14 SDOH — ECONOMIC STABILITY: TRANSPORTATION INSECURITY
IN THE PAST 12 MONTHS, HAS THE LACK OF TRANSPORTATION KEPT YOU FROM MEDICAL APPOINTMENTS OR FROM GETTING MEDICATIONS?: NO

## 2020-01-14 SDOH — ECONOMIC STABILITY: TRANSPORTATION INSECURITY
IN THE PAST 12 MONTHS, HAS LACK OF TRANSPORTATION KEPT YOU FROM MEETINGS, WORK, OR FROM GETTING THINGS NEEDED FOR DAILY LIVING?: NO

## 2020-01-14 SDOH — ECONOMIC STABILITY: FOOD INSECURITY: WITHIN THE PAST 12 MONTHS, YOU WORRIED THAT YOUR FOOD WOULD RUN OUT BEFORE YOU GOT MONEY TO BUY MORE.: NEVER TRUE

## 2020-01-14 SDOH — ECONOMIC STABILITY: INCOME INSECURITY: HOW HARD IS IT FOR YOU TO PAY FOR THE VERY BASICS LIKE FOOD, HOUSING, MEDICAL CARE, AND HEATING?: VERY HARD

## 2020-01-14 SDOH — ECONOMIC STABILITY: FOOD INSECURITY: WITHIN THE PAST 12 MONTHS, THE FOOD YOU BOUGHT JUST DIDN'T LAST AND YOU DIDN'T HAVE MONEY TO GET MORE.: NEVER TRUE

## 2020-01-14 ASSESSMENT — ENCOUNTER SYMPTOMS
EYE PAIN: 0
SHORTNESS OF BREATH: 0
ALLERGIC/IMMUNOLOGIC NEGATIVE: 1
CONSTIPATION: 0
VOMITING: 0
BACK PAIN: 0
DIARRHEA: 0
ABDOMINAL PAIN: 0
SORE THROAT: 0
TROUBLE SWALLOWING: 0
EYE REDNESS: 0
COUGH: 0
WHEEZING: 0
EYE DISCHARGE: 0
RHINORRHEA: 0
NAUSEA: 0

## 2020-01-14 ASSESSMENT — PATIENT HEALTH QUESTIONNAIRE - PHQ9
SUM OF ALL RESPONSES TO PHQ QUESTIONS 1-9: 0
SUM OF ALL RESPONSES TO PHQ9 QUESTIONS 1 & 2: 0
2. FEELING DOWN, DEPRESSED OR HOPELESS: 0
SUM OF ALL RESPONSES TO PHQ QUESTIONS 1-9: 0
1. LITTLE INTEREST OR PLEASURE IN DOING THINGS: 0

## 2020-01-14 NOTE — PROGRESS NOTES
[address] [address]  Dept: [phone]  Dept Fax: [phone]  Loc: [address]     Gabe Hui is a 47 y.o.male here to be established as a new patient. He was previously seen by a nurse practitioner for his general medical care up until about a year or so ago. Patient has a history of MICHAELA but has not been using his CPAP for couple of years and is experiencing worsening sleep hygiene and tiredness. Patient also stopped taking all of his medications which include vitamin D, omeprazole, and statin. Patient desires to get back on the proper medications. Currently unemployed and lives with his brother. He is not in an intimate relationship with anyone. He is a non-smoker but constantly exposed to secondhand smoke. Denies anxiety or depression. Patient Active Problem List   Diagnosis    Morbid obesity with BMI of 45.0-49.9, adult ([facility])    Poor sleep hygiene    Obstructive sleep apnea on CPAP       Current Outpatient Medications   Medication Sig Dispense Refill    omeprazole (PRILOSEC) 20 MG delayed release capsule Take 1 capsule by mouth daily 30 capsule 3    Cholecalciferol (VITAMIN D) 50 MCG (2000 UT) CAPS capsule Take 1 capsule by mouth daily 30 capsule 3    albuterol sulfate HFA (PROAIR HFA) 108 (90 Base) MCG/ACT inhaler Inhale 2 puffs into the lungs every 4 hours as needed for Wheezing (Patient not taking: Reported on 1/14/2020) 1 Inhaler 0    ibuprofen (ADVIL;MOTRIN) 800 MG tablet Take 1 tablet by mouth every 8 hours as needed for Pain (Patient not taking: Reported on 1/14/2020) 30 tablet 0     No current facility-administered medications for this visit. Review of Systems   Constitutional: Negative for activity change, fatigue and fever. HENT: Negative for congestion, ear pain, rhinorrhea, sore throat and trouble swallowing.     Eyes: Negative for pain, discharge and GLUCOSE 132 (H) 07/17/2017    CALCIUM 9.2 07/17/2017    PROT 7.6 07/17/2017    LABALBU 4.3 07/17/2017    BILITOT 0.5 07/17/2017    ALKPHOS 62 07/17/2017    AST 21 07/17/2017    ALT 43 07/17/2017    LABGLOM 78 (A) 07/17/2017       No results found for: LABMICR, [id number]    No results found for: TSH, V1XLWHU, H7KTUNG, THYROIDAB    Lab Results   Component Value Date    WBC 5.6 07/17/2017    HGB 15.8 07/17/2017    HCT 46.8 07/17/2017    MCV 97.2 (H) 07/17/2017     07/17/2017       No results found for: PSA, PSADIA    Immunization History   Administered Date(s) Administered    Tdap (Boostrix, Adacel) 09/28/2017       Health Maintenance   Topic Date Due    HIV screen  11/15/1984    Lipid screen  11/15/2009    Diabetes screen  11/15/2009    Flu vaccine (1) 09/01/2019    Shingles Vaccine (1 of 2) 11/15/2019    Colon cancer screen colonoscopy  11/15/2019    DTaP/Tdap/Td vaccine (2 - Td) 09/28/2027    Pneumococcal 0-64 years Vaccine  Aged Out       No future appointments. ASSESSMENT       He also has had exacerbation of chronic low back pain problem with a loss of confidence in his ambulation a few times over the last couple of weeks. He states he has a sensation of numbness down both legs when he has this sharp stabbing midline back pain, sometimes he has noticed that happens when he gets upset with his brother at home; he denies cauda equina symptoms, has only had x-rays ever done of his low back. Diagnosis Orders   1. Wellness examination  Basic metabolic panel    CBC WITH AUTO DIFFERENTIAL    Lipid panel    Hepatic function panel   2. Obstructive sleep apnea on CPAP  [facility]   3. Chronic midline low back pain without sciatica  MRI LUMBAR SPINE W WO CONTRAST   4. Gastroesophageal reflux disease, esophagitis presence not specified  omeprazole (PRILOSEC) 20 MG delayed release capsule   5.  Vitamin D deficiency  Cholecalciferol (VITAMIN D) 50 MCG (2000 UT) CAPS capsule    Vitamin

## 2020-01-15 ENCOUNTER — TELEPHONE (OUTPATIENT)
Dept: FAMILY MEDICINE CLINIC | Age: 50
End: 2020-01-15

## 2020-01-17 RX ORDER — ROSUVASTATIN CALCIUM 5 MG/1
5 TABLET, COATED ORAL DAILY
Qty: 90 TABLET | Refills: 1 | Status: SHIPPED | OUTPATIENT
Start: 2020-01-17 | End: 2020-01-21 | Stop reason: SDUPTHER

## 2020-01-21 RX ORDER — MULTIVIT-MIN/IRON/FOLIC ACID/K 18-600-40
1 CAPSULE ORAL DAILY
Qty: 90 CAPSULE | Refills: 1 | Status: SHIPPED | OUTPATIENT
Start: 2020-01-21 | End: 2020-01-23 | Stop reason: SDUPTHER

## 2020-01-21 RX ORDER — OMEPRAZOLE 20 MG/1
20 CAPSULE, DELAYED RELEASE ORAL DAILY
Qty: 90 CAPSULE | Refills: 1 | Status: SHIPPED | OUTPATIENT
Start: 2020-01-21 | End: 2020-01-23 | Stop reason: SDUPTHER

## 2020-01-21 RX ORDER — ROSUVASTATIN CALCIUM 5 MG/1
5 TABLET, COATED ORAL DAILY
Qty: 90 TABLET | Refills: 1 | Status: SHIPPED | OUTPATIENT
Start: 2020-01-21 | End: 2020-01-23 | Stop reason: SDUPTHER

## 2020-01-22 ENCOUNTER — TELEPHONE (OUTPATIENT)
Dept: FAMILY MEDICINE CLINIC | Age: 50
End: 2020-01-22

## 2020-01-22 NOTE — TELEPHONE ENCOUNTER
MRI lumbar spine was denied.  The insurance requires records that shows pt failed to improve a 6 wk trial of doctor prescribed treatment within 3 months prior to an approval.

## 2020-01-23 RX ORDER — OMEPRAZOLE 20 MG/1
20 CAPSULE, DELAYED RELEASE ORAL DAILY
Qty: 90 CAPSULE | Refills: 1 | Status: SHIPPED | OUTPATIENT
Start: 2020-01-23 | End: 2021-06-18 | Stop reason: DRUGHIGH

## 2020-01-23 RX ORDER — MULTIVIT-MIN/IRON/FOLIC ACID/K 18-600-40
1 CAPSULE ORAL DAILY
Qty: 90 CAPSULE | Refills: 1 | Status: SHIPPED | OUTPATIENT
Start: 2020-01-23 | End: 2020-10-29 | Stop reason: SDUPTHER

## 2020-01-23 RX ORDER — ROSUVASTATIN CALCIUM 5 MG/1
5 TABLET, COATED ORAL DAILY
Qty: 90 TABLET | Refills: 1 | Status: SHIPPED | OUTPATIENT
Start: 2020-01-23 | End: 2021-02-16

## 2020-02-12 ENCOUNTER — OFFICE VISIT (OUTPATIENT)
Dept: PULMONOLOGY | Age: 50
End: 2020-02-12
Payer: MEDICAID

## 2020-02-12 VITALS
SYSTOLIC BLOOD PRESSURE: 106 MMHG | WEIGHT: 255.2 LBS | DIASTOLIC BLOOD PRESSURE: 70 MMHG | HEIGHT: 65 IN | HEART RATE: 65 BPM | BODY MASS INDEX: 42.52 KG/M2 | OXYGEN SATURATION: 98 %

## 2020-02-12 PROCEDURE — G8484 FLU IMMUNIZE NO ADMIN: HCPCS | Performed by: PHYSICIAN ASSISTANT

## 2020-02-12 PROCEDURE — 99214 OFFICE O/P EST MOD 30 MIN: CPT | Performed by: PHYSICIAN ASSISTANT

## 2020-02-12 PROCEDURE — 3017F COLORECTAL CA SCREEN DOC REV: CPT | Performed by: PHYSICIAN ASSISTANT

## 2020-02-12 PROCEDURE — G8427 DOCREV CUR MEDS BY ELIG CLIN: HCPCS | Performed by: PHYSICIAN ASSISTANT

## 2020-02-12 PROCEDURE — 1036F TOBACCO NON-USER: CPT | Performed by: PHYSICIAN ASSISTANT

## 2020-02-12 PROCEDURE — G8417 CALC BMI ABV UP PARAM F/U: HCPCS | Performed by: PHYSICIAN ASSISTANT

## 2020-02-12 ASSESSMENT — ENCOUNTER SYMPTOMS
DIARRHEA: 0
SHORTNESS OF BREATH: 0
ALLERGIC/IMMUNOLOGIC NEGATIVE: 1
WHEEZING: 0
CHEST TIGHTNESS: 0
COUGH: 0
BACK PAIN: 0
EYES NEGATIVE: 1
NAUSEA: 0
STRIDOR: 0

## 2020-02-12 NOTE — PROGRESS NOTES
lungs every 4 hours as needed for Wheezing (Patient not taking: Reported on 1/14/2020) 1 Inhaler 0    ibuprofen (ADVIL;MOTRIN) 800 MG tablet Take 1 tablet by mouth every 8 hours as needed for Pain (Patient not taking: Reported on 1/14/2020) 30 tablet 0     No current facility-administered medications for this visit. History reviewed. No pertinent family history. Review of Systems -   Review of Systems   Constitutional: Negative for activity change, appetite change, chills and fever. HENT: Negative for congestion and postnasal drip. Eyes: Negative. Respiratory: Negative for cough, chest tightness, shortness of breath, wheezing and stridor. Cardiovascular: Negative for chest pain and leg swelling. Gastrointestinal: Negative for diarrhea and nausea. Endocrine: Negative. Genitourinary: Negative. Musculoskeletal: Negative. Negative for arthralgias and back pain. Skin: Negative. Allergic/Immunologic: Negative. Neurological: Negative. Negative for dizziness and light-headedness. Psychiatric/Behavioral: Negative. All other systems reviewed and are negative. Physical Exam:    BMI:  Body mass index is 43.13 kg/m². Wt Readings from Last 3 Encounters:   02/12/20 255 lb 3.2 oz (115.8 kg)   01/14/20 259 lb 3.2 oz (117.6 kg)   02/23/19 250 lb (113.4 kg)     Weight stable / unchanged  Vitals: /70 (Site: Left Upper Arm, Position: Sitting, Cuff Size: Medium Adult)   Pulse 65   Ht 5' 4.5\" (1.638 m)   Wt 255 lb 3.2 oz (115.8 kg)   SpO2 98% Comment: on room air  BMI 43.13 kg/m²       Physical Exam  Constitutional:       Appearance: He is well-developed. HENT:      Head: Normocephalic and atraumatic. Right Ear: External ear normal.      Left Ear: External ear normal.   Eyes:      Conjunctiva/sclera: Conjunctivae normal.      Pupils: Pupils are equal, round, and reactive to light. Neck:      Musculoskeletal: Normal range of motion and neck supple. Cardiovascular:      Rate and Rhythm: Normal rate and regular rhythm. Heart sounds: Normal heart sounds. Pulmonary:      Effort: Pulmonary effort is normal.      Breath sounds: Normal breath sounds. Musculoskeletal: Normal range of motion. Skin:     General: Skin is warm and dry. Neurological:      Mental Status: He is alert and oriented to person, place, and time. Psychiatric:         Behavior: Behavior normal.         Thought Content: Thought content normal.         Judgment: Judgment normal.           ASSESSMENT/DIAGNOSIS     Diagnosis Orders   1. Obstructive sleep apnea  DME Order for CPAP as OP   2. Morbid obesity with BMI of 40.0-44.9, adult ([facility] [address]     3. Hypersomnia     4. Snoring              Plan   Do you need any equipment today? Yes update supplies  - will try changing to auto to improve comfort  - He  was advised to continue current positive airway pressure therapy with above described pressure. - He  advised to keep good compliance with current recommended pressure to get optimal results and clinical improvement  - Recommend 7-9 hours of sleep with PAP  - He was advised to call DME company regarding supplies if needed.   -He call my office for earlier appointment if needed for worsening of sleep symptoms.   - He was instructed on weight loss  - Veronica Sanchez was educated about my impression and plan. Patient verbalizesunderstanding.   We will see Dat Willis back in: 2 months with download    Information added by my medical assistant/LPN was reviewed today         Prateek Billingsley PA-C, MPAS  2/12/2020

## 2020-02-18 ENCOUNTER — TELEPHONE (OUTPATIENT)
Dept: FAMILY MEDICINE CLINIC | Age: 50
End: 2020-02-18

## 2020-02-18 NOTE — TELEPHONE ENCOUNTER
Patient calls stating [facility] was going to set up his MRI but they told him the office needs to remove the order from Natasha Hoover so they can schedule themselves? ?? He was confused as to what they were wanting him to do, but then his phone cut off so not sure if someone can help with this? Please call patient. Thank you!     DOLV  1/14/20

## 2020-02-18 NOTE — TELEPHONE ENCOUNTER
Left VM letting him know that we are aware that [facility] is doing the MRI, Harshil Amy has this documented. Our order has been cancelled and withdrawn.

## 2020-06-22 ENCOUNTER — OFFICE VISIT (OUTPATIENT)
Dept: FAMILY MEDICINE CLINIC | Age: 50
End: 2020-06-22
Payer: MEDICAID

## 2020-06-22 VITALS
WEIGHT: 267.2 LBS | RESPIRATION RATE: 16 BRPM | HEART RATE: 73 BPM | SYSTOLIC BLOOD PRESSURE: 126 MMHG | HEIGHT: 65 IN | BODY MASS INDEX: 44.52 KG/M2 | TEMPERATURE: 98.1 F | DIASTOLIC BLOOD PRESSURE: 76 MMHG

## 2020-06-22 PROCEDURE — 99214 OFFICE O/P EST MOD 30 MIN: CPT | Performed by: NURSE PRACTITIONER

## 2020-06-22 PROCEDURE — 3017F COLORECTAL CA SCREEN DOC REV: CPT | Performed by: NURSE PRACTITIONER

## 2020-06-22 PROCEDURE — G8417 CALC BMI ABV UP PARAM F/U: HCPCS | Performed by: NURSE PRACTITIONER

## 2020-06-22 PROCEDURE — G8427 DOCREV CUR MEDS BY ELIG CLIN: HCPCS | Performed by: NURSE PRACTITIONER

## 2020-06-22 PROCEDURE — 1036F TOBACCO NON-USER: CPT | Performed by: NURSE PRACTITIONER

## 2020-06-22 ASSESSMENT — ENCOUNTER SYMPTOMS
WHEEZING: 0
CONSTIPATION: 0
EYE PAIN: 0
SHORTNESS OF BREATH: 0
TROUBLE SWALLOWING: 0
COUGH: 0
DIARRHEA: 0
ABDOMINAL PAIN: 0
SORE THROAT: 0
NAUSEA: 0
BACK PAIN: 0
EYE REDNESS: 0
VOMITING: 0
EYE DISCHARGE: 0
RHINORRHEA: 0
ALLERGIC/IMMUNOLOGIC NEGATIVE: 1

## 2020-06-22 NOTE — PROGRESS NOTES
[address] [facility]  [place] [address]  Dept: [phone]  Dept Fax: [phone]  Loc: [phone]       KODY Garcia is a 47 y.o.male was established here as a new patient 6 months ago very concerned about tiredness and obstructive sleep apnea. He did follow-up with pulmonology in February prior to the pandemic. He has been struggling with the CPAP equipment as in nasal versus mask but has been somewhat successful over the last few days. He has not noticed any improvement with his sleep. He continues to work his  job getting up at 3 AM every day. He states he feels very tired and listless, does not have any drive. He continues to be frustrated that he is single at his age but denies any significant depression. ,       Patient Active Problem List   Diagnosis    Morbid obesity with BMI of 45.0-49.9, adult ([facility] [address]    Poor sleep hygiene    Obstructive sleep apnea on CPAP       Current Outpatient Medications   Medication Sig Dispense Refill    CPAP Machine MISC by Does not apply route Please change CPAP pressure to auto 10-20 cm H20. 1 each 0    rosuvastatin (CRESTOR) 5 MG tablet Take 1 tablet by mouth daily 90 tablet 1    omeprazole (PRILOSEC) 20 MG delayed release capsule Take 1 capsule by mouth daily 90 capsule 1    Cholecalciferol (VITAMIN D) 50 MCG (2000 UT) CAPS capsule Take 1 capsule by mouth daily 90 capsule 1    ibuprofen (ADVIL;MOTRIN) 800 MG tablet Take 1 tablet by mouth every 8 hours as needed for Pain 30 tablet 0    albuterol sulfate HFA (PROAIR HFA) 108 (90 Base) MCG/ACT inhaler Inhale 2 puffs into the lungs every 4 hours as needed for Wheezing (Patient not taking: Reported on 1/14/2020) 1 Inhaler 0     No current facility-administered medications for this visit. Review of Systems   Constitutional: Negative for activity change, fatigue and fever.    HENT: Negative for congestion, ear vaccine  Aged Out    Hepatitis B vaccine  Aged Out    Hib vaccine  Aged Out    Meningococcal (ACWY) vaccine  Aged Out    Pneumococcal 0-64 years Vaccine  Aged Out       Future Appointments   Date Time Provider Chan Causey   9/2/2020  2:45 PM Shelby Armstrnog PA-C Pulm Med [address]       ASSESSMENT       Diagnosis Orders   1. Obstructive sleep apnea on CPAP     2. Morbid obesity with BMI of 45.0-49.9, adult (H[facility])  HIV-1 and HIV-2 Antibodies   3. Other fatigue  Testosterone    Vitamin D 25 Hydroxy   4. Screening for prostate cancer  PSA Prostatic Specific Antigen       PLAN      1 lab tests ordered. We will follow-up on those results.     Electronically signed by ARIK Kingsley CNP on 6/22/2020 at 3:54 PM

## 2020-07-01 ENCOUNTER — HOSPITAL ENCOUNTER (OUTPATIENT)
Dept: GENERAL RADIOLOGY | Age: 50
Discharge: HOME | End: 2020-07-01
Payer: MEDICAID

## 2020-07-01 ENCOUNTER — HOSPITAL ENCOUNTER (OUTPATIENT)
Age: 50
Discharge: HOME | End: 2020-07-01
Payer: MEDICAID

## 2020-07-01 PROCEDURE — 73660 X-RAY EXAM OF TOE(S): CPT

## 2020-07-01 PROCEDURE — 72100 X-RAY EXAM L-S SPINE 2/3 VWS: CPT

## 2020-07-27 ENCOUNTER — HOSPITAL ENCOUNTER (OUTPATIENT)
Dept: GENERAL RADIOLOGY | Age: 50
Discharge: HOME | End: 2020-07-27
Payer: MEDICAID

## 2020-07-27 ENCOUNTER — HOSPITAL ENCOUNTER (OUTPATIENT)
Age: 50
Discharge: HOME | End: 2020-07-27
Payer: MEDICAID

## 2020-07-27 PROCEDURE — 73080 X-RAY EXAM OF ELBOW: CPT

## 2020-09-02 ENCOUNTER — OFFICE VISIT (OUTPATIENT)
Dept: PULMONOLOGY | Age: 50
End: 2020-09-02
Payer: MEDICAID

## 2020-09-02 VITALS
OXYGEN SATURATION: 99 % | SYSTOLIC BLOOD PRESSURE: 130 MMHG | DIASTOLIC BLOOD PRESSURE: 78 MMHG | WEIGHT: 277.6 LBS | HEART RATE: 76 BPM | TEMPERATURE: 98 F | BODY MASS INDEX: 46.25 KG/M2 | RESPIRATION RATE: 16 BRPM | HEIGHT: 65 IN

## 2020-09-02 PROCEDURE — G8417 CALC BMI ABV UP PARAM F/U: HCPCS | Performed by: PHYSICIAN ASSISTANT

## 2020-09-02 PROCEDURE — 3017F COLORECTAL CA SCREEN DOC REV: CPT | Performed by: PHYSICIAN ASSISTANT

## 2020-09-02 PROCEDURE — 1036F TOBACCO NON-USER: CPT | Performed by: PHYSICIAN ASSISTANT

## 2020-09-02 PROCEDURE — G8427 DOCREV CUR MEDS BY ELIG CLIN: HCPCS | Performed by: PHYSICIAN ASSISTANT

## 2020-09-02 PROCEDURE — 99214 OFFICE O/P EST MOD 30 MIN: CPT | Performed by: PHYSICIAN ASSISTANT

## 2020-09-02 ASSESSMENT — ENCOUNTER SYMPTOMS
BACK PAIN: 0
STRIDOR: 0
NAUSEA: 0
SHORTNESS OF BREATH: 0
ALLERGIC/IMMUNOLOGIC NEGATIVE: 1
EYES NEGATIVE: 1
WHEEZING: 0
CHEST TIGHTNESS: 0
DIARRHEA: 0
COUGH: 0

## 2020-09-02 NOTE — PROGRESS NOTES
[facility]      Josselin Kruse         [id number]  9/2/2020  Chief Complaint   Patient presents with    Follow-up     MICHAELA 2 mo f/u with download        Pt of Dr. Bethel LOPEZ Download:   Original or initial AHI: 16.2   Date of initial study: 10-12-15      Compliant  17%     Noncompliant 17 %     PAP Type Auto set Level  10/20 cmH2O  Avg Hrs/Day 3 hrs 39 min   AHI: 2.2   Recorded compliance dates , 7-31-20 to 8-29-20  Machine/Mfg:   [x] ResMed    [] Respironics/Dreamstation   Interface:   [] Nasal    [] Nasal pillows   [x] FFM      Provider:      [] SR-HME     []Brittany     [] Joan    [] Jaime Bernardo    [] Margieermans               [] [facility]      [] Adaptive    [] [address]      [x] Other    Neck Size: 16.2       Mallampati 10-12-15  ESS:  2      Here is a scan of the most recent download:          Presentation:   Sofiya Monzon presents for sleep medicine follow up for obstructive sleep apnea  Since the last visit, Sofiya Monzon is rarely wearing PAP. He states that he sleeps in his Semi and does not take it with him. 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 He is sleeping ok and tired most of the time. Equipment issues: The pressure is  acceptable, the mask is acceptable     Sleep issues:  Do you feel better? Yes- when wearing PAP  More rested? Yes when wearing PAP  Better concentration? no    Progress History:   Since last visit any new medical issues? No  New ER or hospitlal visits? No  Any new or changes in medicines? No  Any new sleep medicines? No        Past Medical History:   Diagnosis Date    High cholesterol     Sleep apnea        No past surgical history on file.     Social History     Tobacco Use    Smoking status: Passive Smoke Exposure - Never Smoker    Smokeless tobacco: Never Used   Substance Use Topics    Alcohol use: No     Alcohol/week: 0.0 standard drinks    Drug use: No       No Known Allergies    Current Outpatient Medications   Medication Sig Dispense Refill    CPAP Machine MISC by Does not apply route Please change CPAP pressure to auto 10-20 cm H20. 1 each 0    rosuvastatin (CRESTOR) 5 MG tablet Take 1 tablet by mouth daily 90 tablet 1    omeprazole (PRILOSEC) 20 MG delayed release capsule Take 1 capsule by mouth daily 90 capsule 1    Cholecalciferol (VITAMIN D) 50 MCG (2000 UT) CAPS capsule Take 1 capsule by mouth daily 90 capsule 1    ibuprofen (ADVIL;MOTRIN) 800 MG tablet Take 1 tablet by mouth every 8 hours as needed for Pain 30 tablet 0    albuterol sulfate HFA (PROAIR HFA) 108 (90 Base) MCG/ACT inhaler Inhale 2 puffs into the lungs every 4 hours as needed for Wheezing (Patient not taking: Reported on 1/14/2020) 1 Inhaler 0     No current facility-administered medications for this visit. No family history on file. Review of Systems -   Review of Systems   Constitutional: Negative for activity change, appetite change, chills and fever. HENT: Negative for congestion and postnasal drip. Eyes: Negative. Respiratory: Negative for cough, chest tightness, shortness of breath, wheezing and stridor. Cardiovascular: Negative for chest pain and leg swelling. Gastrointestinal: Negative for diarrhea and nausea. Endocrine: Negative. Genitourinary: Negative. Musculoskeletal: Negative. Negative for arthralgias and back pain. Skin: Negative. Allergic/Immunologic: Negative. Neurological: Negative. Negative for dizziness and light-headedness. Psychiatric/Behavioral: Negative. All other systems reviewed and are negative. Physical Exam:    BMI:  Body mass index is 46.91 kg/m².     Wt Readings from Last 3 Encounters:   09/02/20 277 lb 9.6 oz (125.9 kg)   06/22/20 267 lb 3.2 oz (121.2 kg)   02/12/20 255 lb 3.2 oz (115.8 kg)     Weight gained 22 lbs over 7 months  Vitals: /78 (Site: Left Upper Arm, Position: Sitting, Cuff Size: Medium Adult)   Pulse 76   Temp 98 °F (36.7 °C) Comment: Forehead  Resp 16   Ht 5'

## 2020-09-15 ENCOUNTER — TELEPHONE (OUTPATIENT)
Dept: FAMILY MEDICINE CLINIC | Age: 50
End: 2020-09-15

## 2020-09-15 ENCOUNTER — NURSE ONLY (OUTPATIENT)
Dept: LAB | Age: 50
End: 2020-09-15

## 2020-09-15 LAB
PROSTATE SPECIFIC ANTIGEN: 0.98 NG/ML (ref 0–1)
VITAMIN D 25-HYDROXY: 26 NG/ML (ref 30–100)

## 2020-09-15 NOTE — TELEPHONE ENCOUNTER
Patient requesting referral to GI for colon cancer screening. Victoria Rogers for referral to [facility]?

## 2020-09-17 LAB — HIV-2 AB: NEGATIVE

## 2020-09-18 LAB — TESTOSTERONE TOTAL: 172 NG/DL (ref 300–890)

## 2020-09-22 ENCOUNTER — OFFICE VISIT (OUTPATIENT)
Dept: FAMILY MEDICINE CLINIC | Age: 50
End: 2020-09-22
Payer: MEDICAID

## 2020-09-22 VITALS
WEIGHT: 270 LBS | RESPIRATION RATE: 20 BRPM | HEART RATE: 100 BPM | HEIGHT: 65 IN | TEMPERATURE: 98.1 F | BODY MASS INDEX: 44.98 KG/M2 | DIASTOLIC BLOOD PRESSURE: 56 MMHG | SYSTOLIC BLOOD PRESSURE: 90 MMHG

## 2020-09-22 PROCEDURE — 1036F TOBACCO NON-USER: CPT | Performed by: NURSE PRACTITIONER

## 2020-09-22 PROCEDURE — 99214 OFFICE O/P EST MOD 30 MIN: CPT | Performed by: NURSE PRACTITIONER

## 2020-09-22 PROCEDURE — G8417 CALC BMI ABV UP PARAM F/U: HCPCS | Performed by: NURSE PRACTITIONER

## 2020-09-22 PROCEDURE — 3017F COLORECTAL CA SCREEN DOC REV: CPT | Performed by: NURSE PRACTITIONER

## 2020-09-22 PROCEDURE — G8427 DOCREV CUR MEDS BY ELIG CLIN: HCPCS | Performed by: NURSE PRACTITIONER

## 2020-09-22 RX ORDER — PREDNISONE 20 MG/1
20 TABLET ORAL 2 TIMES DAILY
Qty: 10 TABLET | Refills: 0 | Status: SHIPPED | OUTPATIENT
Start: 2020-09-22 | End: 2020-09-27

## 2020-09-22 RX ORDER — BACLOFEN 10 MG/1
10 TABLET ORAL 3 TIMES DAILY
Qty: 45 TABLET | Refills: 0 | Status: SHIPPED | OUTPATIENT
Start: 2020-09-22 | End: 2020-10-07

## 2020-09-22 ASSESSMENT — ENCOUNTER SYMPTOMS
TROUBLE SWALLOWING: 0
COUGH: 0
EYE PAIN: 0
SORE THROAT: 0
RHINORRHEA: 0
SHORTNESS OF BREATH: 0
EYE DISCHARGE: 0
BACK PAIN: 0
EYE REDNESS: 0
ALLERGIC/IMMUNOLOGIC NEGATIVE: 1
NAUSEA: 0
ABDOMINAL PAIN: 0
DIARRHEA: 0
WHEEZING: 0
VOMITING: 0
CONSTIPATION: 0

## 2020-09-22 NOTE — PROGRESS NOTES
[facility]  [address] [place] [address]  Dept: [phone]  Dept Fax: [phone]  Loc: [phone]     Visit Date:  9/22/2020      Patient:  Wandy Galaviz  YOB: 1970    HPI:     Chief Complaint   Patient presents with    Hand Numbness     His right hand/arm will go numb  when he is walking his dog. He also notices it when he is laying in bed and holding his phone up in front of his face with both hands. Presents with complaint of several months of worsening right pain and sense of loss of  as it is progressed. States it starts in the top of his shoulder and goes all the way into his right hand. He is predominantly right-handed. 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 No problems with his left hand, no neck pain, no previous neck injuries or surgeries. When he is resting it is not as bad. No particular joint pain, fever.       Medications    Current Outpatient Medications:     predniSONE (DELTASONE) 20 MG tablet, Take 1 tablet by mouth 2 times daily for 5 days, Disp: 10 tablet, Rfl: 0    baclofen (LIORESAL) 10 MG tablet, Take 1 tablet by mouth 3 times daily for 15 days, Disp: 45 tablet, Rfl: 0    CPAP Machine MISC, by Does not apply route Please change CPAP pressure to auto 10-20 cm H20., Disp: 1 each, Rfl: 0    rosuvastatin (CRESTOR) 5 MG tablet, Take 1 tablet by mouth daily, Disp: 90 tablet, Rfl: 1    omeprazole (PRILOSEC) 20 MG delayed release capsule, Take 1 capsule by mouth daily, Disp: 90 capsule, Rfl: 1    Cholecalciferol (VITAMIN D) 50 MCG (2000 UT) CAPS capsule, Take 1 capsule by mouth daily, Disp: 90 capsule, Rfl: 1    ibuprofen (ADVIL;MOTRIN) 800 MG tablet, Take 1 tablet by mouth every 8 hours as needed for Pain, Disp: 30 tablet, Rfl: 0    albuterol sulfate HFA (PROAIR HFA) 108 (90 Base) MCG/ACT inhaler, Inhale 2 puffs into the lungs every 4 hours as needed for Wheezing (Patient not taking: Reported on 1/14/2020), Disp: 1 Inhaler, Rfl: 0    The patient has No Known Allergies. Past Medical History  Davey Gilmore  has a past medical history of High cholesterol and Sleep apnea. Subjective:      Review of Systems   Constitutional: Negative for activity change, fatigue and fever. HENT: Negative for congestion, ear pain, rhinorrhea, sore throat and trouble swallowing. Eyes: Negative for pain, discharge and redness. Respiratory: Negative for cough, shortness of breath and wheezing. Cardiovascular: Negative. Gastrointestinal: Negative for abdominal pain, constipation, diarrhea, nausea and vomiting. Endocrine: Negative. Genitourinary: Negative for dysuria, frequency and urgency. Musculoskeletal: Negative for arthralgias, back pain and myalgias. Right arm pain   Skin: Negative for rash. Allergic/Immunologic: Negative. Neurological: Negative for dizziness, tremors, weakness and headaches. Hematological: Negative. Psychiatric/Behavioral: Negative for dysphoric mood and sleep disturbance. The patient is not nervous/anxious. Objective:     BP (!) 90/56   Pulse 100   Temp 98.1 °F (36.7 °C) (Temporal)   Resp 20   Ht 5' 5.25\" (1.657 m)   Wt 270 lb (122.5 kg)   BMI 44.59 kg/m²     Physical Exam  Constitutional:       General: He is not in acute distress. Appearance: He is well-developed. He is not diaphoretic. HENT:      Right Ear: External ear normal.      Left Ear: External ear normal.      Nose: Nose normal.      Mouth/Throat:      Mouth: Mucous membranes are moist.   Eyes:      General:         Right eye: No discharge. Left eye: No discharge. Conjunctiva/sclera: Conjunctivae normal.      Pupils: Pupils are equal, round, and reactive to light. Neck:      Musculoskeletal: Normal range of motion. Vascular: No JVD. Cardiovascular:      Rate and Rhythm: Normal rate and regular rhythm.    Pulmonary:      Effort: Pulmonary effort is normal. No respiratory distress. Musculoskeletal: Normal range of motion. General: Tenderness present. No swelling, deformity or signs of injury. Skin:     General: Skin is warm and dry. Capillary Refill: Capillary refill takes less than 2 seconds. Coloration: Skin is not pale. Findings: No erythema or rash. Neurological:      Mental Status: He is alert and oriented to person, place, and time. Coordination: Coordination normal.   Psychiatric:         Behavior: Behavior normal.         Thought Content: Thought content normal.         Judgment: Judgment normal.         Assessment/Plan:      Marla Velasquez was seen today for hand numbness. He is tender to palpation in the right forearm along the radial nerve area. No pain with movement of neck, no edema of his arm, PMS normal.    Diagnoses and all orders for this visit:    Chronic pain of right upper extremity  -     XR CERVICAL SPINE (2-3 VIEWS); Future  -     predniSONE (DELTASONE) 20 MG tablet; Take 1 tablet by mouth 2 times daily for 5 days  -     baclofen (LIORESAL) 10 MG tablet; Take 1 tablet by mouth 3 times daily for 15 days  -     Boris Milner MD, Orthopedic Surgery, [facility]N [facility]        No follow-ups on file. Patient instructions given earled.         Electronically signed by ARIK Salinas CNP on 9/22/2020 at 3:36 PM

## 2020-10-29 ENCOUNTER — TELEPHONE (OUTPATIENT)
Dept: FAMILY MEDICINE CLINIC | Age: 50
End: 2020-10-29

## 2020-10-29 RX ORDER — MULTIVIT-MIN/IRON/FOLIC ACID/K 18-600-40
1 CAPSULE ORAL DAILY
Qty: 90 CAPSULE | Refills: 1 | Status: SHIPPED | OUTPATIENT
Start: 2020-10-29 | End: 2021-07-19

## 2020-10-29 NOTE — TELEPHONE ENCOUNTER
He normally takes a Vitamin D prescription but he lost it months ago in his bedroom so he needs a refill. He was notified about referral to urology.

## 2020-10-29 NOTE — TELEPHONE ENCOUNTER
Patient had low testosterone, and mildly low vitamin D.  PSA was normal.  I put in a referral for urology to follow-up about the low testosterone. Since I looked to see if I had responded earlier on this and I think I was on vacation and missed it.

## 2020-11-23 ENCOUNTER — OFFICE VISIT (OUTPATIENT)
Dept: UROLOGY | Age: 50
End: 2020-11-23
Payer: MEDICAID

## 2020-11-23 VITALS — BODY MASS INDEX: 44.98 KG/M2 | HEIGHT: 65 IN | TEMPERATURE: 97.2 F | WEIGHT: 270 LBS

## 2020-11-23 LAB
BILIRUBIN URINE: NEGATIVE
BLOOD URINE, POC: NEGATIVE
CHARACTER, URINE: CLEAR
COLOR, URINE: YELLOW
GLUCOSE URINE: NEGATIVE MG/DL
KETONES, URINE: NEGATIVE
LEUKOCYTE CLUMPS, URINE: NEGATIVE
NITRITE, URINE: NEGATIVE
PH, URINE: 6 (ref 5–9)
PROTEIN, URINE: NEGATIVE MG/DL
SPECIFIC GRAVITY, URINE: >= 1.03 (ref 1–1.03)
UROBILINOGEN, URINE: 0.2 EU/DL (ref 0–1)

## 2020-11-23 PROCEDURE — G8417 CALC BMI ABV UP PARAM F/U: HCPCS | Performed by: UROLOGY

## 2020-11-23 PROCEDURE — 1036F TOBACCO NON-USER: CPT | Performed by: UROLOGY

## 2020-11-23 PROCEDURE — 99203 OFFICE O/P NEW LOW 30 MIN: CPT | Performed by: UROLOGY

## 2020-11-23 PROCEDURE — G8428 CUR MEDS NOT DOCUMENT: HCPCS | Performed by: UROLOGY

## 2020-11-23 PROCEDURE — 3017F COLORECTAL CA SCREEN DOC REV: CPT | Performed by: UROLOGY

## 2020-11-23 PROCEDURE — G8484 FLU IMMUNIZE NO ADMIN: HCPCS | Performed by: UROLOGY

## 2020-11-23 PROCEDURE — 81003 URINALYSIS AUTO W/O SCOPE: CPT | Performed by: UROLOGY

## 2020-11-23 NOTE — PROGRESS NOTES
Dr. Joseph Jordan MD  [facility] [address] [facility] Urology Clinic Consultation / New Patient Visit    Patient:  Deirdre Singh  YOB: 1970  Date: 11/23/2020  Consult requested from ARIK John CNP     HISTORY OF PRESENT ILLNESS:   The patient is a 45 y.o. male who presents today for follow-up for the following problem(s): low testosterone  Overall the problem(s) : are worsening. Associated Symptoms: No dysuria, gross hematuria. Pain Severity:      Today visit:   11/23/20  Amrit Spaulding presents with symptoms of decreased energy. 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    Testosterone 172, but drawn at 1 pm.    Summary of old records:   (Patient's old records, notes and chart reviewed and summarized above.)    Last several PSA's:  Lab Results   Component Value Date    PSA 0.98 09/15/2020       Last total testosterone:  Lab Results   Component Value Date    TESTOSTERONE 172 (L) 09/15/2020       Urinalysis today:  Results for POC orders placed in visit on 11/23/20   POCT Urinalysis No Micro (Auto)   Result Value Ref Range    Glucose, Ur Negative NEGATIVE mg/dl    Bilirubin Urine Negative     Ketones, Urine Negative NEGATIVE    Specific Gravity, Urine >= 1.030 1.002 - 1.030    Blood, UA POC Negative NEGATIVE    pH, Urine 6.00 5.0 - 9.0    Protein, Urine Negative NEGATIVE mg/dl    Urobilinogen, Urine 0.20 0.0 - 1.0 eu/dl    Nitrite, Urine Negative NEGATIVE    Leukocyte Clumps, Urine Negative NEGATIVE    Color, Urine Yellow YELLOW-STRAW    Character, Urine Clear [id number]         Last BUN and creatinine:  Lab Results   Component Value Date    BUN 13 01/14/2020     Lab Results   Component Value Date    CREATININE 1.0 01/14/2020       Imaging Reviewed during this Office Visit:   (results were independently reviewed by physician and radiology report verified)    PAST MEDICAL, FAMILY AND SOCIAL HISTORY:  Past Medical History:   Diagnosis Date    High cholesterol     Low testosterone 2020    dr Elio Wahl     Sleep apnea      History reviewed. No pertinent surgical history. History reviewed. No pertinent family history. No outpatient medications have been marked as taking for the 11/23/20 encounter (Office Visit) with Geri Mclain MD.       Patient has no known allergies. Social History     Tobacco Use   Smoking Status Passive Smoke Exposure - Never Smoker   Smokeless Tobacco Never Used       Social History     Substance and Sexual Activity   Alcohol Use No    Alcohol/week: 0.0 standard drinks       REVIEW OF SYSTEMS:  Constitutional: negative  Eyes: negative  Respiratory: negative  Cardiovascular: negative  Gastrointestinal: negative  Musculoskeletal: negative  Genitourinary: negative  Skin: negative   Neurological: negative  Hematological/Lymphatic: negative  Psychological: negative    Physical Exam:    This a 45 y.o. male   Vitals:    11/23/20 1524   Temp: 97.2 °F (36.2 °C)     Constitutional: Patient in no acute distress   Neuro: alert and oriented to person place and time. Psych: Mood and affect normal.  Head: atraumatic normocephalic  Eyes: EOMi  HEENT: neck supple, trachea midline  Lungs: Respiratory effort normal  Cardiovascular:  Normal peripheral pulses  Abdomen: Soft, non-tender, non-distended, No CVA  Bladder: non-tender and not distended. FROMx4, no cyanosis clubbing edema  Skin: warm and dry      Assessment and Plan      1. Low testosterone           Plan:      No follow-ups on file.   Repeat testosterone  Follow up 4 weeks

## 2020-12-14 ENCOUNTER — OFFICE VISIT (OUTPATIENT)
Dept: PULMONOLOGY | Age: 50
End: 2020-12-14
Payer: MEDICAID

## 2020-12-14 VITALS
BODY MASS INDEX: 45.48 KG/M2 | WEIGHT: 273 LBS | DIASTOLIC BLOOD PRESSURE: 64 MMHG | SYSTOLIC BLOOD PRESSURE: 114 MMHG | HEIGHT: 65 IN | HEART RATE: 93 BPM | OXYGEN SATURATION: 98 %

## 2020-12-14 PROCEDURE — 99214 OFFICE O/P EST MOD 30 MIN: CPT | Performed by: PHYSICIAN ASSISTANT

## 2020-12-14 PROCEDURE — G8484 FLU IMMUNIZE NO ADMIN: HCPCS | Performed by: PHYSICIAN ASSISTANT

## 2020-12-14 PROCEDURE — G8417 CALC BMI ABV UP PARAM F/U: HCPCS | Performed by: PHYSICIAN ASSISTANT

## 2020-12-14 PROCEDURE — 1036F TOBACCO NON-USER: CPT | Performed by: PHYSICIAN ASSISTANT

## 2020-12-14 PROCEDURE — G8427 DOCREV CUR MEDS BY ELIG CLIN: HCPCS | Performed by: PHYSICIAN ASSISTANT

## 2020-12-14 PROCEDURE — 3017F COLORECTAL CA SCREEN DOC REV: CPT | Performed by: PHYSICIAN ASSISTANT

## 2020-12-14 ASSESSMENT — ENCOUNTER SYMPTOMS
STRIDOR: 0
EYES NEGATIVE: 1
BACK PAIN: 0
CHEST TIGHTNESS: 0
ALLERGIC/IMMUNOLOGIC NEGATIVE: 1
COUGH: 0
NAUSEA: 0
SHORTNESS OF BREATH: 0
WHEEZING: 0
DIARRHEA: 0

## 2020-12-14 NOTE — PROGRESS NOTES
Current Outpatient Medications   Medication Sig Dispense Refill    Cholecalciferol (VITAMIN D) 50 MCG (2000 UT) CAPS capsule Take 1 capsule by mouth daily 90 capsule 1    CPAP Machine MISC by Does not apply route Please change CPAP pressure to auto 10-20 cm H20. 1 each 0    rosuvastatin (CRESTOR) 5 MG tablet Take 1 tablet by mouth daily 90 tablet 1    omeprazole (PRILOSEC) 20 MG delayed release capsule Take 1 capsule by mouth daily 90 capsule 1    albuterol sulfate HFA (PROAIR HFA) 108 (90 Base) MCG/ACT inhaler Inhale 2 puffs into the lungs every 4 hours as needed for Wheezing 1 Inhaler 0    ibuprofen (ADVIL;MOTRIN) 800 MG tablet Take 1 tablet by mouth every 8 hours as needed for Pain 30 tablet 0     No current facility-administered medications for this visit. No family history on file. Review of Systems -   Review of Systems   Constitutional: Negative for activity change, appetite change, chills and fever. HENT: Negative for congestion and postnasal drip. Eyes: Negative. Respiratory: Negative for cough, chest tightness, shortness of breath, wheezing and stridor. Cardiovascular: Negative for chest pain and leg swelling. Gastrointestinal: Negative for diarrhea and nausea. Endocrine: Negative. Genitourinary: Negative. Musculoskeletal: Negative. Negative for arthralgias and back pain. Skin: Negative. Allergic/Immunologic: Negative. Neurological: Negative. Negative for dizziness and light-headedness. Psychiatric/Behavioral: Negative. All other systems reviewed and are negative. Physical Exam:    BMI:  Body mass index is 45.43 kg/m².     Wt Readings from Last 3 Encounters:   12/14/20 273 lb (123.8 kg)   11/23/20 270 lb (122.5 kg)   09/22/20 270 lb (122.5 kg)     Weight stable / unchanged Vitals: /64 (Site: Right Upper Arm, Position: Sitting, Cuff Size: Large Adult)   Pulse 93   Ht 5' 5\" (1.651 m)   Wt 273 lb (123.8 kg)   SpO2 98% Comment: on room air at rest  BMI 45.43 kg/m²       Physical Exam  Constitutional:       Appearance: He is well-developed. HENT:      Head: Normocephalic and atraumatic. Right Ear: External ear normal.      Left Ear: External ear normal.   Eyes:      Conjunctiva/sclera: Conjunctivae normal.      Pupils: Pupils are equal, round, and reactive to light. Neck:      Musculoskeletal: Normal range of motion and neck supple. Cardiovascular:      Rate and Rhythm: Normal rate and regular rhythm. Heart sounds: Normal heart sounds. Pulmonary:      Effort: Pulmonary effort is normal.      Breath sounds: Normal breath sounds. Musculoskeletal: Normal range of motion. Skin:     General: Skin is warm and dry. Neurological:      Mental Status: He is alert and oriented to person, place, and time. Psychiatric:         Behavior: Behavior normal.         Thought Content: Thought content normal.         Judgment: Judgment normal.           ASSESSMENT/DIAGNOSIS     Diagnosis Orders   1. Obstructive sleep apnea     2. Morbid obesity with BMI of 45.0-49.9, adult ([facility] [address]     3. Hypersomnia              Plan   Do you need any equipment today? 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 - He  advised to keep good compliance with current recommended pressure to get optimal results and clinical improvement  - Recommend 7-9 hours of sleep with PAP  - He was advised to call [employer] regarding supplies if needed.   -He call my office for earlier appointment if needed for worsening of sleep symptoms. - He was instructed on weight loss  - Jone Stoner was educated about my impression and plan. Patient verbalizesunderstanding.   We will see Naveed Yadav back in: 2 months with download    Information added by my medical assistant/LPN was reviewed today         Rossy Joyner PA-C, MPAS  12/14/2020

## 2020-12-28 ENCOUNTER — OFFICE VISIT (OUTPATIENT)
Dept: UROLOGY | Age: 50
End: 2020-12-28
Payer: MEDICAID

## 2020-12-28 VITALS — HEIGHT: 65 IN | WEIGHT: 273 LBS | TEMPERATURE: 97.2 F | BODY MASS INDEX: 45.48 KG/M2

## 2020-12-28 PROCEDURE — G8484 FLU IMMUNIZE NO ADMIN: HCPCS | Performed by: UROLOGY

## 2020-12-28 PROCEDURE — G8427 DOCREV CUR MEDS BY ELIG CLIN: HCPCS | Performed by: UROLOGY

## 2020-12-28 PROCEDURE — 99214 OFFICE O/P EST MOD 30 MIN: CPT | Performed by: UROLOGY

## 2020-12-28 PROCEDURE — 1036F TOBACCO NON-USER: CPT | Performed by: UROLOGY

## 2020-12-28 PROCEDURE — G8417 CALC BMI ABV UP PARAM F/U: HCPCS | Performed by: UROLOGY

## 2020-12-28 PROCEDURE — 3017F COLORECTAL CA SCREEN DOC REV: CPT | Performed by: UROLOGY

## 2020-12-28 RX ORDER — TESTOSTERONE 12.5 MG/1.25G
2.5 GEL TOPICAL DAILY
Qty: 75 G | Refills: 5 | Status: SHIPPED | OUTPATIENT
Start: 2020-12-28 | End: 2022-08-08

## 2020-12-28 NOTE — PROGRESS NOTES
and      [facility] Patient Visit    Patient:  Peyton Hood  YOB: 1970  Date: 12/28/2020  Consult requested from ARIK Ruth CNP     HISTORY OF PRESENT ILLNESS:   The patient is a 45 y.o. male who presents today for follow-up for the following problem(s): low testosterone  Overall the problem(s) : are worsening. Associated Symptoms: No dysuria, gross hematuria. Pain Severity:      Today visit:   12/28/20  Today liane follows up for repeat testosterone. His T is < 200, and he states he is symptomatic. Symptoms include decreased energy, decreased libido, and erectile dysfunction, which are worsening. 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 We discuss multiple factors could be leading to his symptoms. We discuss TRT in detail and discuss all risks and benefits of therapy. All questions are answered and he understands. 11/23/20  Scooter Minaya presents with symptoms of decreased energy. 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 Testosterone 172, but drawn at 1 pm.    Summary of old records:   (Patient's old records, notes and chart reviewed and summarized above.)    Last several PSA's:  Lab Results   Component Value Date    PSA 0.98 09/15/2020       Last total testosterone:  Lab Results   Component Value Date    TESTOSTERONE 172 (L) 09/15/2020       Urinalysis today:  No results found for this visit on 12/28/20. Last BUN and creatinine:  Lab Results   Component Value Date    BUN 13 01/14/2020     Lab Results   Component Value Date    CREATININE 1.0 01/14/2020       Imaging Reviewed during this Office Visit:   (results were independently reviewed by physician and radiology report verified)    PAST MEDICAL, FAMILY AND SOCIAL HISTORY:  Past Medical History:   Diagnosis Date    High cholesterol     Low testosterone 2020    dr Trish Claros     Sleep apnea      History reviewed.  No pertinent surgical history. History reviewed. No pertinent family history. Outpatient Medications Marked as Taking for the 12/28/20 encounter (Office Visit) with Joe Bright MD   Medication Sig Dispense Refill    Cholecalciferol (VITAMIN D) 50 MCG (2000 UT) CAPS capsule Take 1 capsule by mouth daily 90 capsule 1    CPAP Machine MISC by Does not apply route Please change CPAP pressure to auto 10-20 cm H20. 1 each 0    rosuvastatin (CRESTOR) 5 MG tablet Take 1 tablet by mouth daily 90 tablet 1    omeprazole (PRILOSEC) 20 MG delayed release capsule Take 1 capsule by mouth daily 90 capsule 1    albuterol sulfate HFA (PROAIR HFA) 108 (90 Base) MCG/ACT inhaler Inhale 2 puffs into the lungs every 4 hours as needed for Wheezing 1 Inhaler 0    ibuprofen (ADVIL;MOTRIN) 800 MG tablet Take 1 tablet by mouth every 8 hours as needed for Pain 30 tablet 0       Patient has no known allergies. Social History     Tobacco Use   Smoking Status Passive Smoke Exposure - Never Smoker   Smokeless Tobacco Never Used       Social History     Substance and Sexual Activity   Alcohol Use No    Alcohol/week: 0.0 standard drinks       REVIEW OF SYSTEMS:  Constitutional: negative  Eyes: negative  Respiratory: negative  Cardiovascular: negative  Gastrointestinal: negative  Musculoskeletal: negative  Genitourinary: negative  Skin: negative   Neurological: negative  Hematological/Lymphatic: negative  Psychological: negative    Physical Exam:    This a 45 y.o. male   Vitals:    12/28/20 1541   Temp: 97.2 °F (36.2 °C)     Constitutional: Patient in no acute distress   Neuro: alert and oriented to person place and time. Psych: Mood and affect normal.  Head: atraumatic normocephalic  Eyes: EOMi  HEENT: neck supple, trachea midline  Lungs: Respiratory effort normal  Cardiovascular:  Normal peripheral pulses  Abdomen: Soft, non-tender, non-distended, No CVA  Bladder: non-tender and not distended.   FROMx4, no cyanosis clubbing edema  Skin: warm and dry      Assessment and Plan      1. Hypogonadism in male    2. Erectile dysfunction due to arterial insufficiency    3. Decreased libido           Plan:      No follow-ups on file.   Androgel  Follow up 1 month with labs

## 2021-01-12 ENCOUNTER — TELEPHONE (OUTPATIENT)
Dept: UROLOGY | Age: 51
End: 2021-01-12

## 2021-01-12 NOTE — TELEPHONE ENCOUNTER
Testosterone -PA Case: [id number], Status: Approved, Coverage Starts on: 1/12/21  Coverage Ends on: 1/12/2022.

## 2021-01-12 NOTE — TELEPHONE ENCOUNTER
Prior Auth initiated for Testosterone . PA sent to Covermymeds. Should receive response in 3-5 days.

## 2021-01-18 ENCOUNTER — NURSE ONLY (OUTPATIENT)
Dept: LAB | Age: 51
End: 2021-01-18

## 2021-01-18 DIAGNOSIS — E29.1 HYPOGONADISM IN MALE: ICD-10-CM

## 2021-01-18 LAB
ALBUMIN SERPL-MCNC: 4.3 G/DL (ref 3.5–5.1)
ALP BLD-CCNC: 57 U/L (ref 38–126)
ALT SERPL-CCNC: 69 U/L (ref 11–66)
ANION GAP SERPL CALCULATED.3IONS-SCNC: 8 MEQ/L (ref 8–16)
AST SERPL-CCNC: 36 U/L (ref 5–40)
BILIRUB SERPL-MCNC: 0.5 MG/DL (ref 0.3–1.2)
BILIRUBIN DIRECT: < 0.2 MG/DL (ref 0–0.3)
BUN BLDV-MCNC: 14 MG/DL (ref 7–22)
CALCIUM SERPL-MCNC: 9.5 MG/DL (ref 8.5–10.5)
CHLORIDE BLD-SCNC: 108 MEQ/L (ref 98–111)
CO2: 28 MEQ/L (ref 23–33)
CREAT SERPL-MCNC: 1.1 MG/DL (ref 0.4–1.2)
ERYTHROCYTE [DISTWIDTH] IN BLOOD BY AUTOMATED COUNT: 12.2 % (ref 11.5–14.5)
ERYTHROCYTE [DISTWIDTH] IN BLOOD BY AUTOMATED COUNT: 45.8 FL (ref 35–45)
GLUCOSE BLD-MCNC: 97 MG/DL (ref 70–108)
HCT VFR BLD CALC: 46.7 % (ref 42–52)
HEMOGLOBIN: 15.2 GM/DL (ref 14–18)
MCH RBC QN AUTO: 33.2 PG (ref 26–33)
MCHC RBC AUTO-ENTMCNC: 32.5 GM/DL (ref 32.2–35.5)
MCV RBC AUTO: 102 FL (ref 80–94)
PLATELET # BLD: 291 THOU/MM3 (ref 130–400)
PMV BLD AUTO: 8.7 FL (ref 9.4–12.4)
POTASSIUM SERPL-SCNC: 4.5 MEQ/L (ref 3.5–5.2)
PROSTATE SPECIFIC ANTIGEN: 0.97 NG/ML (ref 0–1)
RBC # BLD: 4.58 MILL/MM3 (ref 4.7–6.1)
SODIUM BLD-SCNC: 144 MEQ/L (ref 135–145)
TOTAL PROTEIN: 7.1 G/DL (ref 6.1–8)
WBC # BLD: 5.3 THOU/MM3 (ref 4.8–10.8)

## 2021-01-19 ENCOUNTER — TELEPHONE (OUTPATIENT)
Dept: UROLOGY | Age: 51
End: 2021-01-19

## 2021-01-19 NOTE — TELEPHONE ENCOUNTER
Patient recently started on Androgel. LFT is slightly abnormal at 69. Testosterone level pending. Patient has follow-up appointment on January 25 with Dr. Gerald Carlin.

## 2021-01-20 LAB — TESTOSTERONE TOTAL: 173 NG/DL (ref 300–890)

## 2021-01-25 ENCOUNTER — OFFICE VISIT (OUTPATIENT)
Dept: UROLOGY | Age: 51
End: 2021-01-25
Payer: COMMERCIAL

## 2021-01-25 VITALS — BODY MASS INDEX: 47.48 KG/M2 | WEIGHT: 285 LBS | TEMPERATURE: 97.2 F | HEIGHT: 65 IN

## 2021-01-25 DIAGNOSIS — R79.89 LOW TESTOSTERONE: ICD-10-CM

## 2021-01-25 DIAGNOSIS — R68.82 DECREASED LIBIDO: ICD-10-CM

## 2021-01-25 DIAGNOSIS — N52.01 ERECTILE DYSFUNCTION DUE TO ARTERIAL INSUFFICIENCY: ICD-10-CM

## 2021-01-25 DIAGNOSIS — E29.1 HYPOGONADISM IN MALE: Primary | ICD-10-CM

## 2021-01-25 LAB
BILIRUBIN URINE: NEGATIVE
BLOOD URINE, POC: NEGATIVE
CHARACTER, URINE: CLEAR
COLOR, URINE: YELLOW
GLUCOSE URINE: NEGATIVE MG/DL
KETONES, URINE: NEGATIVE
LEUKOCYTE CLUMPS, URINE: NEGATIVE
NITRITE, URINE: NEGATIVE
PH, URINE: 7 (ref 5–9)
PROTEIN, URINE: NEGATIVE MG/DL
SPECIFIC GRAVITY, URINE: 1.02 (ref 1–1.03)
UROBILINOGEN, URINE: 0.2 EU/DL (ref 0–1)

## 2021-01-25 PROCEDURE — G8417 CALC BMI ABV UP PARAM F/U: HCPCS | Performed by: UROLOGY

## 2021-01-25 PROCEDURE — 99213 OFFICE O/P EST LOW 20 MIN: CPT | Performed by: UROLOGY

## 2021-01-25 PROCEDURE — 1036F TOBACCO NON-USER: CPT | Performed by: UROLOGY

## 2021-01-25 PROCEDURE — G8484 FLU IMMUNIZE NO ADMIN: HCPCS | Performed by: UROLOGY

## 2021-01-25 PROCEDURE — 3017F COLORECTAL CA SCREEN DOC REV: CPT | Performed by: UROLOGY

## 2021-01-25 PROCEDURE — 81003 URINALYSIS AUTO W/O SCOPE: CPT | Performed by: UROLOGY

## 2021-01-25 PROCEDURE — G8427 DOCREV CUR MEDS BY ELIG CLIN: HCPCS | Performed by: UROLOGY

## 2021-01-25 NOTE — PROGRESS NOTES
Dr. Miko Dickson MD  [facility] Urology Clinic Consultation / New Patient Visit    Patient:  Natalee Brown  YOB: 1970  Date: 1/25/2021  Consult requested from ARIK Miner CNP     HISTORY OF PRESENT ILLNESS:   The patient is a 45 y.o. male who presents today for follow-up for the following problem(s): low testosterone  Overall the problem(s) : are worsening. Associated Symptoms: No dysuria, gross hematuria. Pain Severity:      Today visit:   1/25/21  Dayami Zaman follows for a chronic unstable problem and recenlty started Testosterone, which is associated with risks and has been discussed. T 178, but just started a couple weeks ago. He is doing well with Androgel, tolerating the medication. Feels like symptoms improving, but still desires better results. Labs wnl.      12/28/20  Today liane follows up for repeat testosterone. His T is < 200, and he states he is symptomatic. Symptoms include decreased energy, decreased libido, and erectile dysfunction, which are worsening. 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 We discuss multiple factors could be leading to his symptoms. We discuss TRT in detail and discuss all risks and benefits of therapy. All questions are answered and he understands. 11/23/20  Jillian Oreilly presents with symptoms of decreased energy. 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    Testosterone 172, but drawn at 1 pm.    Summary of old records:   (Patient's old records, notes and chart reviewed and summarized above.)    Last several PSA's:  Lab Results   Component Value Date    PSA 0.97 01/18/2021    PSA 0.98 09/15/2020       Last total testosterone:  Lab Results   Component Value Date    TESTOSTERONE 173 (L) 01/18/2021       Urinalysis today:  Results for POC orders placed in visit on 01/25/21   POCT Urinalysis No Micro (Auto)   Result Value Ref Range    Glucose, Ur Negative NEGATIVE mg/dl Bilirubin Urine Negative     Ketones, Urine Negative NEGATIVE    Specific Gravity, Urine 1.025 1.002 - 1.030    Blood, UA POC Negative NEGATIVE    pH, Urine 7.00 5.0 - 9.0    Protein, Urine Negative NEGATIVE mg/dl    Urobilinogen, Urine 0.20 0.0 - 1.0 eu/dl    Nitrite, Urine Negative NEGATIVE    Leukocyte Clumps, Urine Negative NEGATIVE    Color, Urine Yellow YELLOW-STRAW    Character, Urine Clear [id number]         Last BUN and creatinine:  Lab Results   Component Value Date    BUN 14 01/18/2021     Lab Results   Component Value Date    CREATININE 1.1 01/18/2021       Imaging Reviewed during this Office Visit:   (results were independently reviewed by physician and radiology report verified)    PAST MEDICAL, FAMILY AND SOCIAL HISTORY:  Past Medical History:   Diagnosis Date    High cholesterol     Low testosterone 2020    dr Solitario Godoy     Sleep apnea      History reviewed. No pertinent surgical history. History reviewed. No pertinent family history. Outpatient Medications Marked as Taking for the 1/25/21 encounter (Office Visit) with John Medrano MD   Medication Sig Dispense Refill    testosterone (ANDROGEL) 25 MG/2.5GM (1%) GEL 1 % gel Apply 2.5 g topically daily for 30 days. 75 g 5    Cholecalciferol (VITAMIN D) 50 MCG (2000 UT) CAPS capsule Take 1 capsule by mouth daily 90 capsule 1    CPAP Machine MISC by Does not apply route Please change CPAP pressure to auto 10-20 cm H20. 1 each 0    rosuvastatin (CRESTOR) 5 MG tablet Take 1 tablet by mouth daily 90 tablet 1    omeprazole (PRILOSEC) 20 MG delayed release capsule Take 1 capsule by mouth daily 90 capsule 1    albuterol sulfate HFA (PROAIR HFA) 108 (90 Base) MCG/ACT inhaler Inhale 2 puffs into the lungs every 4 hours as needed for Wheezing 1 Inhaler 0    ibuprofen (ADVIL;MOTRIN) 800 MG tablet Take 1 tablet by mouth every 8 hours as needed for Pain 30 tablet 0       Patient has no known allergies.   Social History     Tobacco Use Smoking Status Passive Smoke Exposure - Never Smoker   Smokeless Tobacco Never Used       Social History     Substance and Sexual Activity   Alcohol Use No    Alcohol/week: 0.0 standard drinks       REVIEW OF SYSTEMS:  Constitutional: negative  Eyes: negative  Respiratory: negative  Cardiovascular: negative  Gastrointestinal: negative  Musculoskeletal: negative  Genitourinary: negative  Skin: negative   Neurological: negative  Hematological/Lymphatic: negative  Psychological: negative    Physical Exam:    This a 45 y.o. male   Vitals:    01/25/21 1539   Temp: 97.2 °F (36.2 °C)     Constitutional: Patient in no acute distress   Neuro: alert and oriented to person place and time. Psych: Mood and affect normal.  Head: atraumatic normocephalic  Eyes: EOMi  HEENT: neck supple, trachea midline  Lungs: Respiratory effort normal  Cardiovascular:  Normal peripheral pulses  Abdomen: Soft, non-tender, non-distended, No CVA  Bladder: non-tender and not distended. FROMx4, no cyanosis clubbing edema  Skin: warm and dry      Assessment and Plan      1. Hypogonadism in male    2. Erectile dysfunction due to arterial insufficiency    3. Decreased libido    4. Low testosterone           Plan:      No follow-ups on file.   Androgel  Follow up 3 month with labs: Testosterone

## 2021-02-02 ENCOUNTER — OFFICE VISIT (OUTPATIENT)
Dept: PULMONOLOGY | Age: 51
End: 2021-02-02
Payer: COMMERCIAL

## 2021-02-02 VITALS
OXYGEN SATURATION: 98 % | HEIGHT: 65 IN | SYSTOLIC BLOOD PRESSURE: 112 MMHG | BODY MASS INDEX: 46.65 KG/M2 | TEMPERATURE: 96.9 F | WEIGHT: 280 LBS | DIASTOLIC BLOOD PRESSURE: 70 MMHG | HEART RATE: 67 BPM

## 2021-02-02 DIAGNOSIS — G47.10 HYPERSOMNIA: ICD-10-CM

## 2021-02-02 DIAGNOSIS — E66.01 MORBID OBESITY WITH BMI OF 45.0-49.9, ADULT (HCC): ICD-10-CM

## 2021-02-02 DIAGNOSIS — G47.33 OBSTRUCTIVE SLEEP APNEA: Primary | ICD-10-CM

## 2021-02-02 PROCEDURE — G8427 DOCREV CUR MEDS BY ELIG CLIN: HCPCS | Performed by: PHYSICIAN ASSISTANT

## 2021-02-02 PROCEDURE — 99214 OFFICE O/P EST MOD 30 MIN: CPT | Performed by: PHYSICIAN ASSISTANT

## 2021-02-02 PROCEDURE — G8417 CALC BMI ABV UP PARAM F/U: HCPCS | Performed by: PHYSICIAN ASSISTANT

## 2021-02-02 PROCEDURE — 3017F COLORECTAL CA SCREEN DOC REV: CPT | Performed by: PHYSICIAN ASSISTANT

## 2021-02-02 PROCEDURE — 1036F TOBACCO NON-USER: CPT | Performed by: PHYSICIAN ASSISTANT

## 2021-02-02 PROCEDURE — G8484 FLU IMMUNIZE NO ADMIN: HCPCS | Performed by: PHYSICIAN ASSISTANT

## 2021-02-02 ASSESSMENT — ENCOUNTER SYMPTOMS
STRIDOR: 0
WHEEZING: 0
COUGH: 0
BACK PAIN: 0
DIARRHEA: 0
CHEST TIGHTNESS: 0
SHORTNESS OF BREATH: 0
NAUSEA: 0
ALLERGIC/IMMUNOLOGIC NEGATIVE: 1
EYES NEGATIVE: 1

## 2021-02-02 NOTE — PROGRESS NOTES
[facility]      Jeanne Brunner         [id number]  2/2/2021   Chief Complaint   Patient presents with    Sleep Apnea     MICHAELA follow up with pap download         Pt of Dr.     PAP Download:   Original or initial AHI: 16.2    Date of initial study: 10/10/15      Compliant  47%     Noncompliant 33 %     PAP Type  AutoSet Level  10/20   Avg Hrs/Day 4 hours 25 minutes   AHI: 1.7   Recorded compliance dates , 1/2/21  to 1/31/21   Machine/Mfg:   [x] ResMed    [] Respironics/Dreamstation   Interface:   [] Nasal    [] Nasal pillows   [x] FFM      Provider:      [] -GENEVIEVE     []Brittany     [] Joan    [] [place]    [] [facility]               [] [facility]      [] [facility]    [] [place]:      [x] [address] Medical sleep   Neck Size: 16.25  Mallampati 4  ESS:  12  SAQLI: 90    Here is a scan of the most recent download:            Presentation:   Eladio Fowler presents for sleep medicine follow up for obstructive sleep apnea  Since the last visit, Eladio Fowler continues to struggle with PAP. He is not sleeping more than 5 hours at night. He is tired during the day. 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 Equipment issues: The pressure is  acceptable, the mask is acceptable     Sleep issues:  Do you feel better? Yes and No  More rested?no  Better concentration? yes    Progress History:   Since last visit any new medical issues? No  New ER or hospitlal visits? No  Any new or changes in medicines? No  Any new sleep medicines? No        Past Medical History:   Diagnosis Date    High cholesterol     Low testosterone 2020    dr Vance Congress     Sleep apnea        No past surgical history on file.     Social History     Tobacco Use    Smoking status: Passive Smoke Exposure - Never Smoker    Smokeless tobacco: Never Used   Substance Use Topics    Alcohol use: No     Alcohol/week: 0.0 standard drinks    Drug use: No       No Known Allergies    Current Outpatient Medications   Medication Sig Dispense Refill    Cholecalciferol (VITAMIN D) 50 MCG (2000 UT) CAPS capsule Take 1 capsule by mouth daily 90 capsule 1    CPAP Machine MISC by Does not apply route Please change CPAP pressure to auto 10-20 cm H20. 1 each 0    rosuvastatin (CRESTOR) 5 MG tablet Take 1 tablet by mouth daily 90 tablet 1    omeprazole (PRILOSEC) 20 MG delayed release capsule Take 1 capsule by mouth daily (Patient taking differently: Take 40 mg by mouth daily ) 90 capsule 1    albuterol sulfate HFA (PROAIR HFA) 108 (90 Base) MCG/ACT inhaler Inhale 2 puffs into the lungs every 4 hours as needed for Wheezing 1 Inhaler 0    ibuprofen (ADVIL;MOTRIN) 800 MG tablet Take 1 tablet by mouth every 8 hours as needed for Pain 30 tablet 0    testosterone (ANDROGEL) 25 MG/2.5GM (1%) GEL 1 % gel Apply 2.5 g topically daily for 30 days. 75 g 5     No current facility-administered medications for this visit. No family history on file. Review of Systems -   Review of Systems   Constitutional: Negative for activity change, appetite change, chills and fever. HENT: Negative for congestion and postnasal drip. Eyes: Negative. Respiratory: Negative for cough, chest tightness, shortness of breath, wheezing and stridor. Cardiovascular: Negative for chest pain and leg swelling. Gastrointestinal: Negative for diarrhea and nausea. Endocrine: Negative. Genitourinary: Negative. Musculoskeletal: Negative. Negative for arthralgias and back pain. Skin: Negative. Allergic/Immunologic: Negative. Neurological: Negative. Negative for dizziness and light-headedness. Psychiatric/Behavioral: Negative. All other systems reviewed and are negative. Physical Exam:    BMI:  Body mass index is 46.59 kg/m².     Wt Readings from Last 3 Encounters:   02/02/21 280 lb (127 kg)   01/25/21 285 lb (129.3 kg)   12/28/20 273 lb (123.8 kg)     Weight gained 7 lbs over 2 months  Vitals: /70 (Site: Left Upper Arm, Position: Sitting, Cuff Size: Large Adult)   Pulse 67   Temp 96.9 °F (36.1 °C) (Tympanic)   Ht 5' 5\" (1.651 m)   Wt 280 lb (127 kg)   SpO2 98% Comment: room air  BMI 46.59 kg/m²       Physical Exam  Constitutional:       Appearance: He is well-developed. HENT:      Head: Normocephalic and atraumatic. Right Ear: External ear normal.      Left Ear: External ear normal.   Eyes:      Conjunctiva/sclera: Conjunctivae normal.      Pupils: Pupils are equal, round, and reactive to light. Neck:      Musculoskeletal: Normal range of motion and neck supple. Cardiovascular:      Rate and Rhythm: Normal rate and regular rhythm. Heart sounds: Normal heart sounds. Pulmonary:      Effort: Pulmonary effort is normal.      Breath sounds: Normal breath sounds. Musculoskeletal: Normal range of motion. Skin:     General: Skin is warm and dry. Neurological:      Mental Status: He is alert and oriented to person, place, and time. Psychiatric:         Behavior: Behavior normal.         Thought Content: Thought content normal.         Judgment: Judgment normal.           ASSESSMENT/DIAGNOSIS     Diagnosis Orders   1. Obstructive sleep apnea     2. Hypersomnia     3. Morbid obesity with BMI of 45.0-49.9, adult [facility])            Plan   Do you need any equipment today? 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    - He  advised to keep good compliance with current recommended pressure to get optimal results and clinical improvement  - Recommend 7-9 hours of sleep with PAP  - He was advised to call [employer] regarding supplies if needed.   -He call my office for earlier appointment if needed for worsening of sleep symptoms.   - He was instructed on weight loss  - Dina Pacheco was educated about my impression and plan. Patient verbalizesunderstanding.   We will see Lazarus Sheffield back in: 2 months with download    Information added by my medical assistant/LPN was reviewed today          Radha Muñoz PA-C, VANITA  2/2/2021       Total time for visit was 30 min

## 2021-02-10 ENCOUNTER — HOSPITAL ENCOUNTER (OUTPATIENT)
Age: 51
Discharge: HOME | End: 2021-02-10
Payer: COMMERCIAL

## 2021-02-10 ENCOUNTER — TELEPHONE (OUTPATIENT)
Dept: UROLOGY | Age: 51
End: 2021-02-10

## 2021-02-10 DIAGNOSIS — R10.9 FLANK PAIN: Primary | ICD-10-CM

## 2021-02-10 DIAGNOSIS — R10.9 FLANK PAIN: ICD-10-CM

## 2021-02-10 LAB — GFR SERPL CREATININE-BSD FRML MDRD: 85 ML/MIN/1.73M2

## 2021-02-10 PROCEDURE — 87086 URINE CULTURE/COLONY COUNT: CPT

## 2021-02-10 PROCEDURE — 81001 URINALYSIS AUTO W/SCOPE: CPT

## 2021-02-11 LAB
BACTERIA: ABNORMAL
BILIRUBIN URINE: NEGATIVE
BLOOD, URINE: NEGATIVE
CASTS: ABNORMAL /LPF
CASTS: ABNORMAL /LPF
CHARACTER, URINE: ABNORMAL
COLOR: YELLOW
CRYSTALS: ABNORMAL
EPITHELIAL CELLS, UA: ABNORMAL /HPF
GLUCOSE, URINE: NEGATIVE MG/DL
KETONES, URINE: NEGATIVE
LEUKOCYTE EST, POC: NEGATIVE
MISCELLANEOUS LAB TEST RESULT: ABNORMAL
NITRITE, URINE: NEGATIVE
PH UA: 5.5 (ref 5–9)
PROTEIN UA: NEGATIVE MG/DL
RBC URINE: ABNORMAL /HPF
RENAL EPITHELIAL, UA: ABNORMAL
SPECIFIC GRAVITY UA: 1.02 (ref 1–1.03)
UROBILINOGEN, URINE: 0.2 EU/DL (ref 0–1)
WBC UA: ABNORMAL /HPF
YEAST: ABNORMAL

## 2021-02-12 LAB — URINE CULTURE, ROUTINE: NORMAL

## 2021-02-16 DIAGNOSIS — E78.2 ELEVATED TRIGLYCERIDES WITH HIGH CHOLESTEROL: ICD-10-CM

## 2021-02-16 RX ORDER — ROSUVASTATIN CALCIUM 5 MG/1
TABLET, COATED ORAL
Qty: 90 TABLET | Refills: 1 | Status: SHIPPED | OUTPATIENT
Start: 2021-02-16 | End: 2021-08-09

## 2021-05-12 ENCOUNTER — HOSPITAL ENCOUNTER (EMERGENCY)
Age: 51
Discharge: HOME | End: 2021-05-12
Attending: EMERGENCY MEDICINE
Payer: COMMERCIAL

## 2021-05-12 VITALS
DIASTOLIC BLOOD PRESSURE: 69 MMHG | TEMPERATURE: 98.6 F | RESPIRATION RATE: 16 BRPM | HEART RATE: 74 BPM | WEIGHT: 297 LBS | OXYGEN SATURATION: 97 % | SYSTOLIC BLOOD PRESSURE: 129 MMHG | HEIGHT: 65 IN | BODY MASS INDEX: 49.48 KG/M2

## 2021-05-12 DIAGNOSIS — K52.9 ENTERITIS: ICD-10-CM

## 2021-05-12 DIAGNOSIS — R10.13 ABDOMINAL PAIN, EPIGASTRIC: Primary | ICD-10-CM

## 2021-05-12 DIAGNOSIS — E66.01 MORBID OBESITY (HCC): ICD-10-CM

## 2021-05-12 PROCEDURE — 99213 OFFICE O/P EST LOW 20 MIN: CPT

## 2021-05-12 PROCEDURE — 99213 OFFICE O/P EST LOW 20 MIN: CPT | Performed by: EMERGENCY MEDICINE

## 2021-05-12 RX ORDER — LACTOBACILLUS RHAMNOSUS GG 10B CELL
1 CAPSULE ORAL 2 TIMES DAILY
Qty: 90 TABLET | Refills: 0 | Status: SHIPPED | OUTPATIENT
Start: 2021-05-12 | End: 2021-06-18

## 2021-05-12 ASSESSMENT — PAIN SCALES - GENERAL: PAINLEVEL_OUTOF10: 4

## 2021-05-12 ASSESSMENT — PAIN DESCRIPTION - LOCATION: LOCATION: ABDOMEN

## 2021-05-12 NOTE — LETTER
[address] [facility]  [address] [place] [id number]  Phone: [phone]               May 12, 2021    Patient: Dolly Monk   YOB: 1970   Date of Visit: 5/12/2021       To Whom It May Concern: Linda Augustine was seen and treated in our emergency department on 5/12/2021. He may return to work on May 14th 2021.   No work May 13, 2021      Sincerely,       Joe Erickson MD         Signature:__________________________________

## 2021-05-12 NOTE — ED PROVIDER NOTES
[facility] Encounter      [address] [facility]       Chief Complaint   Patient presents with    GI Problem       Nurses Notes reviewed and I agree except as noted in the HPI. HISTORY OF PRESENT ILLNESS   Marshel Mohs is a 45 y.o. male who presents with diarrhea      ICassy MD,  personally performed and participated in key or critical portions of the evaluation and management including personally performing the exam and medical decision making. I verify the accuracy of the documentation by the resident.   Please review resident note for specifics and further details of this urgent care evaluation    Electronically signed by Amaris Ash MD on 5/12/2021 at 5:55 PM     Amaris Ash MD  05/12/21 8995

## 2021-05-12 NOTE — ED TRIAGE NOTES
Ate last piece of  chicken that wasn't completely cooked, not sure if the other chicken was under cooked, 2 to 3 days ago , felt like throwing up yesterday, has had mild diarrhea (soft) yesterday and today

## 2021-05-14 ASSESSMENT — ENCOUNTER SYMPTOMS
ABDOMINAL DISTENTION: 0
ABDOMINAL PAIN: 0
TROUBLE SWALLOWING: 0
DIARRHEA: 1
CONSTIPATION: 0
BLOOD IN STOOL: 0
SHORTNESS OF BREATH: 0
COUGH: 0
EYE PAIN: 0

## 2021-05-14 NOTE — ED PROVIDER NOTES
[address] [facility] Encounter      [address] [facility]       Chief Complaint   Patient presents with    GI Problem     Nurses Notes reviewed and I agree except as noted in the HPI. HISTORY OF PRESENT ILLNESS   Luz Tomlin is a 45 y.o. male who presents for evaluation of abdominal pain. He states that he ate raw chicken 3 days ago. He had one episode of diarrhea. Denies bloody diarrhea or blood per rectum. He denies fevers/chills. His abdominal pain resolved but was previously located in the epigastric region. He is here for further evaluation and guidance. REVIEW OF SYSTEMS     Review of Systems   Constitutional: Negative for chills, fatigue and fever. HENT: Negative for congestion, ear pain, postnasal drip and trouble swallowing. Eyes: Negative for pain and visual disturbance. Respiratory: Negative for cough and shortness of breath. Cardiovascular: Negative for chest pain and palpitations. Gastrointestinal: Positive for diarrhea. Negative for abdominal distention, abdominal pain, blood in stool and constipation. Genitourinary: Negative for dysuria and hematuria. Skin: Negative for rash and wound. Neurological: Negative for dizziness and headaches. Psychiatric/Behavioral: The patient is not nervous/anxious. PAST MEDICAL HISTORY         Diagnosis Date    High cholesterol     Low testosterone 2020    dr Elidia Baumann Sleep apnea        SURGICAL HISTORY     Patient  has no past surgical history on file. CURRENT MEDICATIONS       Discharge Medication List as of 5/12/2021  8:56 PM      CONTINUE these medications which have NOT CHANGED    Details   ZINC PO Take by mouthHistorical Med      rosuvastatin (CRESTOR) 5 MG tablet take 1 tablet by mouth once daily, Disp-90 tablet, R-1Normal      testosterone (ANDROGEL) 25 MG/2.5GM (1%) GEL 1 % gel Apply 2.5 g topically daily for 30 days. , Disp-75 g, R-5Print      Cholecalciferol (VITAMIN D) 50 MCG (2000 UT) CAPS capsule Take 1 capsule by mouth daily, Disp-90 capsule,R-1Normal      CPAP Machine MISC Starting Wed 2/12/2020, Disp-1 each, R-0, PrintPlease change CPAP pressure to auto 10-20 cm H20.      omeprazole (PRILOSEC) 20 MG delayed release capsule Take 1 capsule by mouth daily, Disp-90 capsule, R-1Normal             ALLERGIES     Patient is has No Known Allergies. FAMILY HISTORY     Patient'sfamily history is not on file. SOCIAL HISTORY     Patient  reports that he is a non-smoker but has been exposed to tobacco smoke. He has never used smokeless tobacco. He reports that he does not drink alcohol or use drugs. PHYSICAL EXAM     ED TRIAGE VITALS  BP: 129/69, Temp: 98.6 °F (37 °C), Pulse: 74, Resp: 16, SpO2: 97 %  Physical Exam  Vitals signs and nursing note reviewed. Constitutional:       General: He is not in acute distress. Appearance: He is well-developed. He is obese. He is not diaphoretic. HENT:      Head: Normocephalic and atraumatic. Right Ear: External ear normal.      Left Ear: External ear normal.      Nose: Nose normal.   Eyes:      General: No scleral icterus. Right eye: No discharge. Left eye: No discharge. Conjunctiva/sclera: Conjunctivae normal.   Neck:      Musculoskeletal: Normal range of motion. Cardiovascular:      Rate and Rhythm: Normal rate and regular rhythm. Heart sounds: Normal heart sounds. No murmur. Pulmonary:      Effort: Pulmonary effort is normal.      Breath sounds: Normal breath sounds. Abdominal:      General: Abdomen is flat. Bowel sounds are normal.      Palpations: Abdomen is soft. There is no mass. Tenderness: There is no abdominal tenderness. There is no guarding or rebound. Comments: Negative Pacheco sign   Skin:     General: Skin is warm and dry. Findings: No erythema or rash. Neurological:      Mental Status: He is alert and oriented to person, place, and time.    Psychiatric:         Behavior: Behavior normal. Thought Content: Thought content normal.         Judgment: Judgment normal.         DIAGNOSTIC RESULTS   Labs:No results found for this visit on 05/12/21. IMAGING:  No orders to display     URGENT CARE COURSE:     Vitals:    05/12/21 1741   BP: 129/69   Pulse: 74   Resp: 16   Temp: 98.6 °F (37 °C)   TempSrc: Infrared   SpO2: 97%   Weight: 297 lb (134.7 kg)   Height: 5' 5\" (1.651 m)       Medications - No data to display  PROCEDURES:  FINALIMPRESSION      1. Abdominal pain, epigastric    2. Enteritis    3. Morbid obesity [facility])        DISPOSITION/PLAN   DISPOSITION Decision To Discharge 05/12/2021 06:10:17 PM    Patient was seen and evaluated here in the urgent care. Patient was in no acute distress. Vitals signs were stable, and physical exam was negative for acute abdomen. His abdominal pain and diarrhea resolved. He was given reassurance. Recommended to follow-up with his PCP for further discussion of weight loss and chronic medical management. He was discharged with probiotics. PATIENT REFERRED TO:  ARIK Garcia - CNP  [address] [place] [address] [address] [facility]  [phone]    In 1 week  to discuss weight loss    DISCHARGE MEDICATIONS:  Discharge Medication List as of 5/12/2021  8:56 PM      START taking these medications    Details   Multiple Vitamins-Minerals (CULTURELLE PROBIOTICS + MULTIV) CHEW Take 1 Units by mouth 2 times daily, Disp-90 tablet, R-0Normal           Discharge Medication List as of 5/12/2021  8:56 PM          DO Rekha Apodaca DO  Resident  05/14/21 0165

## 2021-06-18 ENCOUNTER — OFFICE VISIT (OUTPATIENT)
Dept: FAMILY MEDICINE CLINIC | Age: 51
End: 2021-06-18
Payer: MEDICAID

## 2021-06-18 VITALS
WEIGHT: 303 LBS | SYSTOLIC BLOOD PRESSURE: 126 MMHG | DIASTOLIC BLOOD PRESSURE: 76 MMHG | BODY MASS INDEX: 50.48 KG/M2 | TEMPERATURE: 97.9 F | RESPIRATION RATE: 20 BRPM | HEIGHT: 65 IN | HEART RATE: 80 BPM

## 2021-06-18 DIAGNOSIS — Z00.00 ANNUAL PHYSICAL EXAM: Primary | ICD-10-CM

## 2021-06-18 DIAGNOSIS — K64.9 BLEEDING HEMORRHOID: ICD-10-CM

## 2021-06-18 DIAGNOSIS — R60.0 BILATERAL LEG EDEMA: ICD-10-CM

## 2021-06-18 DIAGNOSIS — E66.01 MORBID OBESITY WITH BMI OF 45.0-49.9, ADULT (HCC): ICD-10-CM

## 2021-06-18 DIAGNOSIS — R79.89 LOW TESTOSTERONE IN MALE: ICD-10-CM

## 2021-06-18 DIAGNOSIS — Z12.11 COLON CANCER SCREENING: ICD-10-CM

## 2021-06-18 PROCEDURE — 99396 PREV VISIT EST AGE 40-64: CPT | Performed by: NURSE PRACTITIONER

## 2021-06-18 RX ORDER — OMEPRAZOLE 40 MG/1
40 CAPSULE, DELAYED RELEASE ORAL DAILY
COMMUNITY

## 2021-06-18 SDOH — ECONOMIC STABILITY: FOOD INSECURITY: WITHIN THE PAST 12 MONTHS, YOU WORRIED THAT YOUR FOOD WOULD RUN OUT BEFORE YOU GOT MONEY TO BUY MORE.: NEVER TRUE

## 2021-06-18 SDOH — ECONOMIC STABILITY: FOOD INSECURITY: WITHIN THE PAST 12 MONTHS, THE FOOD YOU BOUGHT JUST DIDN'T LAST AND YOU DIDN'T HAVE MONEY TO GET MORE.: NEVER TRUE

## 2021-06-18 ASSESSMENT — PATIENT HEALTH QUESTIONNAIRE - PHQ9
SUM OF ALL RESPONSES TO PHQ QUESTIONS 1-9: 0
SUM OF ALL RESPONSES TO PHQ QUESTIONS 1-9: 0
2. FEELING DOWN, DEPRESSED OR HOPELESS: 0
SUM OF ALL RESPONSES TO PHQ9 QUESTIONS 1 & 2: 0
SUM OF ALL RESPONSES TO PHQ QUESTIONS 1-9: 0
1. LITTLE INTEREST OR PLEASURE IN DOING THINGS: 0

## 2021-06-18 ASSESSMENT — ENCOUNTER SYMPTOMS
DIARRHEA: 0
VOMITING: 0
EYE REDNESS: 0
BLOOD IN STOOL: 1
SHORTNESS OF BREATH: 0
EYE PAIN: 0
WHEEZING: 0
ALLERGIC/IMMUNOLOGIC NEGATIVE: 1
CONSTIPATION: 0
TROUBLE SWALLOWING: 0
COUGH: 0
SORE THROAT: 0
NAUSEA: 0
ABDOMINAL PAIN: 0
EYE DISCHARGE: 0
BACK PAIN: 0
RHINORRHEA: 0

## 2021-06-18 ASSESSMENT — SOCIAL DETERMINANTS OF HEALTH (SDOH): HOW HARD IS IT FOR YOU TO PAY FOR THE VERY BASICS LIKE FOOD, HOUSING, MEDICAL CARE, AND HEATING?: NOT VERY HARD

## 2021-06-18 NOTE — PATIENT INSTRUCTIONS
Patient Education        Secondhand Smoke: Care Instructions  Your Care Instructions     Secondhand smoke comes from the burning end of a cigarette, cigar, or pipe and the smoke that a smoker exhales. The smoke contains nicotine and many other harmful chemicals. Breathing secondhand smoke can cause or worsen health problems including cancer, asthma, coronary artery disease, and respiratory infections. It can make your eyes and nose burn and cause a sore throat. Secondhand smoke is especially bad for babies and young children whose lungs are still developing. Babies whose parents smoke are more likely to have ear infections, pneumonia, and bronchitis in the first few years of their lives. Secondhand smoke can make asthma symptoms worse in children. If you are pregnant, it is important that you not smoke and that you avoid secondhand smoke. You are more likely to give birth to a baby who weighs less than expected (low birth weight) if you smoke. And your baby may have a greater risk for sudden infant death syndrome (SIDS). Babies whose mothers are exposed to secondhand smoke during pregnancy have a higher risk for health problems. Follow-up care is a key part of your treatment and safety. Be sure to make and go to all appointments, and call your doctor if you are having problems. It's also a good idea to know your test results and keep a list of the medicines you take. How can you care for yourself at home? · Do not smoke or let anyone else smoke in your home. If people must smoke, ask them to go outside. · If people do smoke in your home, choose a room where you can open a window or use a fan to get the smoke outside. · Do not let anyone smoke in your car. If someone must smoke, pull over in a safe place and let them smoke away from the car. · Ask your employer to make sure that you have a smoke-free work area.   · Make sure that your children aren't exposed to secondhand smoke at day care, school, and after-school programs. · Try to choose nonsmoking bars, restaurants, and other public places when you go out. · Help your family and friends who smoke to quit by encouraging them to try. Tell them about treatment resources. Having support from others often helps. · If you smoke, quit. Quitting is hard, but there are ways to boost your chance of quitting tobacco for good. ? Use nicotine gum, patches, or lozenges. Call a quitline. Ask your doctor about stop-smoking programs and medicines. ? Keep trying. When should you call for help? Watch closely for changes in your health, and be sure to contact your doctor if you have any problems. Where can you learn more? Go to https://[employer]. org and sign in to your [employer] account. Enter L004 in the [employer] box to learn more about \"Secondhand Smoke: Care Instructions. \"     If you do not have an account, please click on the \"Sign Up Now\" link. Current as of: February 11, 2021               Content Version: 12.9  © 2006-2021 Healthwise[employer] Incorporated. Care instructions adapted under license by [facility] ([place]). If you have questions about a medical condition or this instruction, always ask your healthcare professional. [address] any warranty or liability for your use of this information.

## 2021-06-18 NOTE — PROGRESS NOTES
2021    Luz Tomlin (:  1970) is a 45 y.o. male, here for a preventive medicine evaluation. Also complaining of bright red blood in stool and on his toilet paper for the past 3 to 4 weeks. Patient Active Problem List   Diagnosis    Morbid obesity with BMI of 45.0-49.9, adult ([address]    Poor sleep hygiene    Obstructive sleep apnea on CPAP       Review of Systems   Constitutional: Negative for activity change, fatigue and fever. HENT: Negative for congestion, ear pain, rhinorrhea, sore throat and trouble swallowing. Eyes: Negative for pain, discharge and redness. Respiratory: Negative for cough, shortness of breath and wheezing. Cardiovascular: Negative. Gastrointestinal: Positive for blood in stool. Negative for abdominal pain, constipation, diarrhea, nausea and vomiting. Endocrine: Negative. Genitourinary: Negative for dysuria, frequency and urgency. Musculoskeletal: Negative for arthralgias, back pain and myalgias. Skin: Negative for rash. Allergic/Immunologic: Negative. Neurological: Negative for dizziness, tremors, weakness and headaches. Hematological: Negative. Psychiatric/Behavioral: Negative for dysphoric mood and sleep disturbance. The patient is not nervous/anxious. Prior to Visit Medications    Medication Sig Taking? Authorizing Provider   omeprazole (PRILOSEC) 40 MG delayed release capsule Take 40 mg by mouth daily Yes Historical Provider, MD   ZINC PO Take by mouth daily  Yes Historical Provider, MD   rosuvastatin (CRESTOR) 5 MG tablet take 1 tablet by mouth once daily Yes ARIK Collado CNP   testosterone (ANDROGEL) 25 MG/2.5GM (1%) GEL 1 % gel Apply 2.5 g topically daily for 30 days. Yes Carrie Luther MD   Cholecalciferol (VITAMIN D) 50 MCG ( UT) CAPS capsule Take 1 capsule by mouth daily Yes ARIK Collado CNP   CPAP Machine MISC by Does not apply route Please change CPAP pressure to auto 10-20 cm H20.  Yes Alonzo Arora PA-C        No Known Allergies    Past Medical History:   Diagnosis Date    High cholesterol     Low testosterone 2020    dr Caty Reeder     Sleep apnea        History reviewed. No pertinent surgical history. Social History     Socioeconomic History    Marital status: Single     Spouse name: Not on file    Number of children: Not on file    Years of education: Not on file    Highest education level: Not on file   Occupational History    Not on file   Tobacco Use    Smoking status: Passive Smoke Exposure - Never Smoker    Smokeless tobacco: Never Used   Substance and Sexual Activity    Alcohol use: No     Alcohol/week: 0.0 standard drinks    Drug use: No    Sexual activity: Never   Other Topics Concern    Not on file   Social History Narrative    Not on file     Social Determinants of Health     Financial Resource Strain: Low Risk     Difficulty of Paying Living Expenses: Not very hard   Food Insecurity: No Food Insecurity    Worried About Running Out of Food in the Last Year: Never true    Myra of Food in the Last Year: Never true   Transportation Needs:     Lack of Transportation (Medical):  Lack of Transportation (Non-Medical):    Physical Activity:     Days of Exercise per Week:     Minutes of Exercise per Session:    Stress:     Feeling of Stress :    Social Connections:     Frequency of Communication with Friends and Family:     Frequency of Social Gatherings with Friends and Family:     Attends Zoroastrianism Services:     Active Member of Clubs or Organizations:     Attends Club or Organization Meetings:     Marital Status:    Intimate Partner Violence:     Fear of Current or Ex-Partner:     Emotionally Abused:     Physically Abused:     Sexually Abused:         History reviewed. No pertinent family history.     ADVANCE DIRECTIVE: N, <no information>    Vitals:    06/18/21 1125   BP: 126/76   Pulse: 80   Resp: 20   Temp: 97.9 °F (36.6 °C)   TempSrc: Oral Weight: (!) 303 lb (137.4 kg)   Height: 5' 5\" (1.651 m)     Estimated body mass index is 50.42 kg/m² as calculated from the following:    Height as of this encounter: 5' 5\" (1.651 m). Weight as of this encounter: 303 lb (137.4 kg). Physical Exam  Vitals reviewed. Constitutional:       General: He is not in acute distress. Appearance: Normal appearance. He is well-developed. He is not toxic-appearing or diaphoretic. HENT:      Head: Normocephalic. No right periorbital erythema or left periorbital erythema. Jaw: No trismus. Right Ear: Hearing and external ear normal.      Left Ear: Hearing and external ear normal.      Nose: Nose normal. No mucosal edema or rhinorrhea. Mouth/Throat:      Mouth: Mucous membranes are moist.      Dentition: Normal dentition. Pharynx: Uvula midline. No posterior oropharyngeal erythema. Tonsils: No tonsillar exudate. 0 on the right. 0 on the left. Eyes:      General: Lids are normal.         Right eye: No discharge. Left eye: No discharge. Conjunctiva/sclera: Conjunctivae normal.      Right eye: Right conjunctiva is not injected. No chemosis. Left eye: No chemosis. Pupils: Pupils are equal, round, and reactive to light. Neck:      Vascular: No JVD. Trachea: Trachea normal.   Cardiovascular:      Rate and Rhythm: Normal rate and regular rhythm. Heart sounds: Normal heart sounds. No murmur heard. Pulmonary:      Effort: Pulmonary effort is normal. No respiratory distress. Breath sounds: Normal breath sounds. No stridor. No wheezing. Abdominal:      General: Bowel sounds are normal. There is no distension. Palpations: Abdomen is soft. Tenderness: There is no abdominal tenderness. Musculoskeletal:         General: No tenderness or signs of injury. Normal range of motion. Cervical back: Full passive range of motion without pain and normal range of motion.    Lymphadenopathy:      Cervical: No ASSESSMENT/PLAN:  1. Annual physical exam  -     Basic Metabolic Panel; Future  -     CBC Auto Differential; Future  -     Lipid Panel; Future  -     Hepatic Function Panel; Future  -     Hemoglobin A1C; Future  2. Low testosterone in male  -     Testosterone; Future  -     Testosterone, free, total; Future  3. Colon cancer screening  -     [facility] - Maile Hilario MD, Gastroenterology, [facility].[place]  4. Bilateral leg edema  -     [facility] - Kenn Young MD, Vascular Medicine, [facility].[place]AB[facility]  5. Bleeding hemorrhoid  6. Morbid obesity with BMI of 45.0-49.9, adult ([address]    I spent a significant amount of time talking with the patient about what is most likely hemorrhoids. Exam of his rectal area is deferred today due to the patient's reluctance. He does agree to follow-up with a routine colonoscopy which she is now eligible for and this will provide some additional evaluation. We also talked about the need for him to lose weight but that is difficult to do in the context of his  profession. He is also concerned about having lymphedema as he is very similar to his mother who has undergone long-term treatment for lymphedema in her legs. I discussed with the patient the possibility of new upcoming weight loss medications later this year that may be of significant help to him and we are going to begin to develop a plan. Return in about 1 year (around 6/18/2022). An electronic signature was used to authenticate this note.     --Sofia Wu, ARIK - CNP on 6/18/2021 at 12:38 PM

## 2021-07-02 ENCOUNTER — HOSPITAL ENCOUNTER (EMERGENCY)
Age: 51
Discharge: HOME | End: 2021-07-03
Attending: EMERGENCY MEDICINE
Payer: COMMERCIAL

## 2021-07-02 VITALS
HEART RATE: 76 BPM | SYSTOLIC BLOOD PRESSURE: 141 MMHG | HEIGHT: 65 IN | DIASTOLIC BLOOD PRESSURE: 66 MMHG | TEMPERATURE: 98.7 F | BODY MASS INDEX: 50.48 KG/M2 | WEIGHT: 303 LBS | OXYGEN SATURATION: 97 % | RESPIRATION RATE: 16 BRPM

## 2021-07-02 DIAGNOSIS — I87.2 VENOUS STASIS DERMATITIS OF BOTH LOWER EXTREMITIES: Primary | ICD-10-CM

## 2021-07-02 LAB
BASOPHILS # BLD: 0.8 %
BASOPHILS ABSOLUTE: 0 THOU/MM3 (ref 0–0.1)
EOSINOPHIL # BLD: 6.5 %
EOSINOPHILS ABSOLUTE: 0.3 THOU/MM3 (ref 0–0.4)
ERYTHROCYTE [DISTWIDTH] IN BLOOD BY AUTOMATED COUNT: 12.5 % (ref 11.5–14.5)
ERYTHROCYTE [DISTWIDTH] IN BLOOD BY AUTOMATED COUNT: 46.8 FL (ref 35–45)
HCT VFR BLD CALC: 44.3 % (ref 42–52)
HEMOGLOBIN: 14.4 GM/DL (ref 14–18)
IMMATURE GRANS (ABS): 0.03 THOU/MM3 (ref 0–0.07)
IMMATURE GRANULOCYTES: 0.6 %
LYMPHOCYTES # BLD: 50.8 %
LYMPHOCYTES ABSOLUTE: 2.7 THOU/MM3 (ref 1–4.8)
MCH RBC QN AUTO: 33 PG (ref 26–33)
MCHC RBC AUTO-ENTMCNC: 32.5 GM/DL (ref 32.2–35.5)
MCV RBC AUTO: 101.4 FL (ref 80–94)
MONOCYTES # BLD: 8.4 %
MONOCYTES ABSOLUTE: 0.4 THOU/MM3 (ref 0.4–1.3)
NUCLEATED RED BLOOD CELLS: 0 /100 WBC
PLATELET # BLD: 290 THOU/MM3 (ref 130–400)
PMV BLD AUTO: 8.6 FL (ref 9.4–12.4)
PRO-BNP: 19.1 PG/ML (ref 0–900)
RBC # BLD: 4.37 MILL/MM3 (ref 4.7–6.1)
REASON FOR REJECTION: NORMAL
REJECTED TEST: NORMAL
SEG NEUTROPHILS: 32.9 %
SEGMENTED NEUTROPHILS ABSOLUTE COUNT: 1.7 THOU/MM3 (ref 1.8–7.7)
TROPONIN T: < 0.01 NG/ML
WBC # BLD: 5.3 THOU/MM3 (ref 4.8–10.8)

## 2021-07-02 PROCEDURE — 36415 COLL VENOUS BLD VENIPUNCTURE: CPT

## 2021-07-02 PROCEDURE — 85025 COMPLETE CBC W/AUTO DIFF WBC: CPT

## 2021-07-02 PROCEDURE — 99284 EMERGENCY DEPT VISIT MOD MDM: CPT

## 2021-07-02 PROCEDURE — 93005 ELECTROCARDIOGRAM TRACING: CPT | Performed by: EMERGENCY MEDICINE

## 2021-07-02 ASSESSMENT — ENCOUNTER SYMPTOMS
EYE REDNESS: 0
NAUSEA: 0
COUGH: 0
VOMITING: 0
SORE THROAT: 0
ABDOMINAL PAIN: 0
CHEST TIGHTNESS: 0
SINUS PRESSURE: 0
PHOTOPHOBIA: 0
COLOR CHANGE: 0
BACK PAIN: 0

## 2021-07-02 ASSESSMENT — PAIN DESCRIPTION - ONSET: ONSET: ON-GOING

## 2021-07-02 ASSESSMENT — PAIN DESCRIPTION - ORIENTATION: ORIENTATION: RIGHT;LEFT;LOWER

## 2021-07-02 ASSESSMENT — PAIN DESCRIPTION - LOCATION: LOCATION: LEG

## 2021-07-02 ASSESSMENT — PAIN SCALES - GENERAL
PAINLEVEL_OUTOF10: 7
PAINLEVEL_OUTOF10: 7

## 2021-07-02 ASSESSMENT — PAIN DESCRIPTION - DESCRIPTORS: DESCRIPTORS: ITCHING;BURNING

## 2021-07-02 ASSESSMENT — PAIN DESCRIPTION - FREQUENCY: FREQUENCY: CONTINUOUS

## 2021-07-02 ASSESSMENT — PAIN DESCRIPTION - PAIN TYPE: TYPE: ACUTE PAIN

## 2021-07-03 LAB
ALBUMIN SERPL-MCNC: 4.3 G/DL (ref 3.5–5.1)
ALP BLD-CCNC: 79 U/L (ref 38–126)
ALT SERPL-CCNC: 62 U/L (ref 11–66)
ANION GAP SERPL CALCULATED.3IONS-SCNC: 7 MEQ/L (ref 8–16)
AST SERPL-CCNC: 33 U/L (ref 5–40)
BILIRUB SERPL-MCNC: 0.3 MG/DL (ref 0.3–1.2)
BILIRUBIN DIRECT: < 0.2 MG/DL (ref 0–0.3)
BUN BLDV-MCNC: 12 MG/DL (ref 7–22)
CALCIUM SERPL-MCNC: 9.6 MG/DL (ref 8.5–10.5)
CHLORIDE BLD-SCNC: 106 MEQ/L (ref 98–111)
CO2: 27 MEQ/L (ref 23–33)
CREAT SERPL-MCNC: 1.1 MG/DL (ref 0.4–1.2)
EKG ATRIAL RATE: 72 BPM
EKG P AXIS: 71 DEGREES
EKG P-R INTERVAL: 158 MS
EKG Q-T INTERVAL: 394 MS
EKG QRS DURATION: 86 MS
EKG QTC CALCULATION (BAZETT): 431 MS
EKG R AXIS: -16 DEGREES
EKG T AXIS: 52 DEGREES
EKG VENTRICULAR RATE: 72 BPM
GFR SERPL CREATININE-BSD FRML MDRD: 85 ML/MIN/1.73M2
GLUCOSE BLD-MCNC: 130 MG/DL (ref 70–108)
OSMOLALITY CALCULATION: 280.9 MOSMOL/KG (ref 275–300)
POTASSIUM REFLEX MAGNESIUM: 4.4 MEQ/L (ref 3.5–5.2)
PRO-BNP: 17.3 PG/ML (ref 0–900)
SODIUM BLD-SCNC: 140 MEQ/L (ref 135–145)
TOTAL PROTEIN: 6.7 G/DL (ref 6.1–8)
TROPONIN T: < 0.01 NG/ML

## 2021-07-03 PROCEDURE — 84484 ASSAY OF TROPONIN QUANT: CPT

## 2021-07-03 PROCEDURE — 83880 ASSAY OF NATRIURETIC PEPTIDE: CPT

## 2021-07-03 PROCEDURE — 80076 HEPATIC FUNCTION PANEL: CPT

## 2021-07-03 PROCEDURE — 36415 COLL VENOUS BLD VENIPUNCTURE: CPT

## 2021-07-03 PROCEDURE — 80048 BASIC METABOLIC PNL TOTAL CA: CPT

## 2021-07-03 NOTE — ED PROVIDER NOTES
[facility] ENCOUNTER          Pt Name: Joan Johnston  MRN: [id number]  [place] 1970  Date of evaluation: 7/2/2021  Emergency Physician: Sydni Kaufman, [address] [facility]       Chief Complaint   Patient presents with    Rash     Bilateral calf      History obtained from the patient. HISTORY OF PRESENT ILLNESS    HPI  Joan Johnston is a 45 y.o. male who presents to the emergency department for evaluation of rash. Patient has had peripheral edema to bilateral lower extremities for the last year. Patient states for the last 2 weeks he has noticed associated itchy rash to his lower extremities. No Sob. No MARCOS. No fever. No chills. No know exposures. The patient has no other acute complaints at this time. REVIEW OF SYSTEMS   Review of Systems   Constitutional: Negative for activity change, chills and fever. HENT: Negative for congestion, sinus pressure and sore throat. Eyes: Negative for photophobia, redness and visual disturbance. Respiratory: Negative for cough and chest tightness. Cardiovascular: Positive for leg swelling. Negative for chest pain and palpitations. Gastrointestinal: Negative for abdominal pain, nausea and vomiting. Endocrine: Negative for polydipsia and polyuria. Genitourinary: Negative for decreased urine volume, difficulty urinating, dysuria, flank pain, frequency and urgency. Musculoskeletal: Negative for back pain, myalgias and neck pain. Skin: Positive for rash. Negative for color change. Neurological: Negative for weakness and headaches. Hematological: Negative for adenopathy. Does not bruise/bleed easily. Psychiatric/Behavioral: Negative for confusion and self-injury. PAST MEDICAL AND SURGICAL HISTORY     Past Medical History:   Diagnosis Date    High cholesterol     Low testosterone 2020    dr Zo Durham     Sleep apnea      History reviewed.  No pertinent surgical history. MEDICATIONS   No current facility-administered medications for this encounter. Current Outpatient Medications:     omeprazole (PRILOSEC) 40 MG delayed release capsule, Take 40 mg by mouth daily, Disp: , Rfl:     ZINC PO, Take by mouth daily , Disp: , Rfl:     rosuvastatin (CRESTOR) 5 MG tablet, take 1 tablet by mouth once daily, Disp: 90 tablet, Rfl: 1    testosterone (ANDROGEL) 25 MG/2.5GM (1%) GEL 1 % gel, Apply 2.5 g topically daily for 30 days. , Disp: 75 g, Rfl: 5    Cholecalciferol (VITAMIN D) 50 MCG (2000 UT) CAPS capsule, Take 1 capsule by mouth daily, Disp: 90 capsule, Rfl: 1    CPAP Machine MISC, by Does not apply route Please change CPAP pressure to auto 10-20 cm H20., Disp: 1 each, Rfl: 0      SOCIAL HISTORY     Social History     Social History Narrative    Not on file     Social History     Tobacco Use    Smoking status: Passive Smoke Exposure - Never Smoker    Smokeless tobacco: Never Used   Substance Use Topics    Alcohol use: No     Alcohol/week: 0.0 standard drinks    Drug use: No         ALLERGIES   No Known Allergies      FAMILY HISTORY   History reviewed. No pertinent family history. PHYSICAL EXAM     ED Triage Vitals [07/02/21 2121]   BP Temp Temp Source Pulse Resp SpO2 Height Weight   (!) 146/78 98.7 °F (37.1 °C) Oral 72 16 96 % 5' 5\" (1.651 m) (!) 303 lb (137.4 kg)         Additional Vital Signs:  Vitals:    07/02/21 2121   BP: (!) 146/78   Pulse: 72   Resp: 16   Temp: 98.7 °F (37.1 °C)   SpO2: 96%       Physical Exam  Vitals and nursing note reviewed. Constitutional:       General: He is not in acute distress. Appearance: He is well-developed. He is not diaphoretic. HENT:      Head: Normocephalic. Eyes:      Pupils: Pupils are equal, round, and reactive to light. Neck:      Vascular: No JVD. Cardiovascular:      Rate and Rhythm: Normal rate and regular rhythm. Heart sounds: Normal heart sounds.    Pulmonary:      Effort: Pulmonary effort is normal.      Breath sounds: Normal breath sounds. Abdominal:      General: Bowel sounds are normal. There is no distension. Palpations: Abdomen is soft. Tenderness: There is no abdominal tenderness. Musculoskeletal:         General: No tenderness or deformity. Normal range of motion. Cervical back: Normal range of motion and neck supple. Right lower leg: Edema present. Left lower leg: Edema present. Comments: Venous stasis dermatitis. Patchy mid leg areas with hemosiderin deposition and mild redness bilateral.   Skin:     General: Skin is warm and dry. Capillary Refill: Capillary refill takes less than 2 seconds. Findings: Rash present. Neurological:      Mental Status: He is alert and oriented to person, place, and time. Comments: Non-focal. Moves all extremities. Initial vital signs and nursing assessment reviewed and normal.   Pulsoximetry is normal per my interpretation. MEDICAL DECISION MAKING   Initial Assessment: Given the patient's above chief complaint and findings on history and physical examination, I thought it was appropriate to consider the following emergency medical conditions: Peripheral edema, likely dependent edema will check for CHF liver disease, renal dysfunction, considered DVT, PAD, cellulitis although some of these diagnoses are unlikely they were considered in my medical decision making. Plan: CBC, BMP, ECG, troponin, BNP symptomatic treatment with VICTORIANO hose, PCP follow-up         ED RESULTS   Laboratory results:  Labs Reviewed - No data to display    Radiologic studies results:  No orders to display       ED Medications administered this visit: Medications - No data to display      ED COURSE          The diagnosis, extensive differential diagnosis, laboratory and imaging findings were discussed at the bedside. The patient was an active participant in their care. They are agreeable to the plan of care.  All questions and

## 2021-07-03 NOTE — ED NOTES
[facility] Emergency Department    [address] [address]    Phone:  [phone]    Fax:  [phone]                                       Guadalupe Terry   MRN: [id number]    Department:  [facility] Emergency Department   Date of Visit:  5/28/2018           Patient Information     Date Of Birth          1981        Your diagnoses for this visit were:     Alcohol dependence with uncomplicated withdrawal (H)     Elevated LFTs        You were seen by Teja Fields MD.      Follow-up Information     Follow up with Carmel Armijo MD. Schedule an appointment as soon as possible for a visit in 5 days.    Specialty:  Family Practice    Why:  For follow up    Contact information:    [facility]  [address] [id number] [phone]        Discharge References/Attachments     ALCOHOL WITHDRAWAL: WHAT TO EXPECT (ENGLISH)    ALCOHOLISM: RESOURCES FOR FAMILY AND FRIENDS (ENGLISH)    GETTING HELP, ALCOHOLISM (ENGLISH)      24 Hour Appointment Hotline       To make an appointment at any [facility], call [phone] ([phone]). If you don't have a family doctor or clinic, we will help you find one. [place] clinics are conveniently located to serve the needs of you and your family.             Review of your medicines      START taking        Dose / Directions Last dose taken    diazepam 5 MG tablet   Commonly known as:  VALIUM   Dose:  2.5-5 mg   Quantity:  10 tablet        Take 0.5-1 tablets (2.5-5 mg) by mouth every 6 hours as needed (Shakiness, anxiety, or difficulty with sleep)   Refills:  0          Our records show that you are taking the medicines listed below. If these are incorrect, please call your family doctor or clinic.        Dose / Directions Last dose taken    desoximetasone 0.05 % Gel   Commonly known as:  TOPICORT        Refills:  0        fish oil-omega-3 fatty acids 1000 MG capsule        Refills:  0         Pt to ED for complaint of bilateral calf pain with redness and swelling, pain rated 7/10 at this time. Pt also states itching in legs. Pt states he is a . Pt states no history of blood clots at this time. Pt states no further symptoms at this time. Pt breaths easy and unlabored, no distress noted at this time.        [facility]  07/02/21 2127 levothyroxine 25 MCG tablet   Commonly known as:  SYNTHROID/LEVOTHROID   Dose:  25 mcg        Take 25 mcg by mouth   Refills:  0        lisinopril-hydrochlorothiazide 20-25 MG per tablet   Commonly known as:  PRINZIDE/ZESTORETIC   Dose:  1 tablet        Take 1 tablet by mouth   Refills:  0        MULTI-VITAMINS Tabs   Dose:  1 tablet        Take 1 tablet by mouth   Refills:  0        norgestim-eth estrad triphasic 0.18/0.215/0.25 MG-35 MCG per tablet   Commonly known as:  ORTHO TRI-CYCLEN, TRI-SPRINTEC   Dose:  1 tablet        Take 1 tablet by mouth   Refills:  0        venlafaxine 150 MG 24 hr capsule   Commonly known as:  EFFEXOR-XR   Dose:  150 mg        Take 150 mg by mouth   Refills:  0        vitamin D3 1000 units Caps   Dose:  1000 Units        Take 1,000 Units by mouth   Refills:  0                Prescriptions were sent or printed at these locations (1 Prescription)                   Other Prescriptions                Printed at Department/Unit printer (1 of 1)         diazepam (VALIUM) 5 MG tablet                Procedures and tests performed during your visit     Alcohol level blood    CBC with platelets, differential    Comprehensive metabolic panel    HCG qualitative urine (UPT)    Magnesium    UA with Microscopic      Orders Needing Specimen Collection     None      Pending Results     No orders found for last 3 day(s).            Pending Culture Results     No orders found for last 3 day(s).            Pending Results Instructions     If you had any lab results that were not finalized at the time of your Discharge, you can call the ED Lab Result RN at [phone]. You will be contacted by this team for any positive Lab results or changes in treatment. The nurses are available 7 days a week from 10A to 6:30P.  You can leave a message 24 hours per day and they will return your call.        Test Results From Your Hospital Stay        5/28/2018 11:22 PM      Component Results     Component Value Ref  Range & Units Status    WBC 6.3 4.0 - 11.0 10e9/L Final    RBC Count 3.89 3.8 - 5.2 10e12/L Final    Hemoglobin 13.5 11.7 - 15.7 g/dL Final    Hematocrit 39.3 35.0 - 47.0 % Final     (H) 78 - 100 fl Final    MCH 34.7 (H) 26.5 - 33.0 pg Final    MCHC 34.4 31.5 - 36.5 g/dL Final    RDW 11.4 10.0 - 15.0 % Final    Platelet Count 238 150 - 450 10e9/L Final    Diff Method Automated Method  Final    % Neutrophils 43.5 % Final    % Lymphocytes 38.9 % Final    % Monocytes 13.6 % Final    % Eosinophils 2.4 % Final    % Basophils 1.4 % Final    % Immature Granulocytes 0.2 % Final    Absolute Neutrophil 2.7 1.6 - 8.3 10e9/L Final    Absolute Lymphocytes 2.4 0.8 - 5.3 10e9/L Final    Absolute Monocytes 0.9 0.0 - 1.3 10e9/L Final    Absolute Eosinophils 0.2 0.0 - 0.7 10e9/L Final    Absolute Basophils 0.1 0.0 - 0.2 10e9/L Final    Abs Immature Granulocytes 0.0 0 - 0.4 10e9/L Final         5/28/2018 11:34 PM      Component Results     Component Value Ref Range & Units Status    Sodium 136 133 - 144 mmol/L Final    Potassium 3.7 3.4 - 5.3 mmol/L Final    Chloride 99 94 - 109 mmol/L Final    Carbon Dioxide 25 20 - 32 mmol/L Final    Anion Gap 12 3 - 14 mmol/L Final    Glucose 101 (H) 70 - 99 mg/dL Final    Urea Nitrogen 7 7 - 30 mg/dL Final    Creatinine 0.52 0.52 - 1.04 mg/dL Final    GFR Estimate >90 >60 mL/min/1.7m2 Final    Non  GFR Calc    GFR Estimate If Black >90 >60 mL/min/1.7m2 Final    African American GFR Calc    Calcium 9.2 8.5 - 10.1 mg/dL Final    Bilirubin Total 0.6 0.2 - 1.3 mg/dL Final    Albumin 4.5 3.4 - 5.0 g/dL Final    Protein Total 8.9 (H) 6.8 - 8.8 g/dL Final    Alkaline Phosphatase 56 40 - 150 U/L Final     (H) 0 - 50 U/L Final     (H) 0 - 45 U/L Final         5/28/2018 11:33 PM      Component Results     Component Value Ref Range & Units Status    Magnesium 1.7 1.6 - 2.3 mg/dL Final         5/28/2018 11:50 PM      Component Results     Component Value Ref Range & Units  "Status    Ethanol g/dL 0.02 (H) <0.01 g/dL Final         5/29/2018 12:35 AM      Component Results     Component Value Ref Range & Units Status    Color Urine Yellow  Final    Appearance Urine Clear  Final    Glucose Urine Negative NEG^Negative mg/dL Final    Bilirubin Urine Negative NEG^Negative Final    Ketones Urine Negative NEG^Negative mg/dL Final    Specific Gravity Urine 1.009 1.003 - 1.035 Final    Blood Urine Negative NEG^Negative Final    pH Urine 9.0 (H) 5.0 - 7.0 pH Final    Protein Albumin Urine Negative NEG^Negative mg/dL Final    Urobilinogen mg/dL 0.0 0.0 - 2.0 mg/dL Final    Nitrite Urine Negative NEG^Negative Final    Leukocyte Esterase Urine Negative NEG^Negative Final    Source Midstream Urine  Final    WBC Urine 6 (H) 0 - 5 /HPF Final    RBC Urine 1 0 - 2 /HPF Final    Squamous Epithelial /HPF Urine <1 0 - 1 /HPF Final         5/29/2018 12:30 AM      Component Results     Component Value Ref Range & Units Status    HCG Qual Urine Negative NEG^Negative Final    This test is for screening purposes.  Results should be interpreted along with   the clinical picture.  Confirmation testing is available if warranted by   ordering [id number], HCG Quantitative Pregnancy.                  Thank you for choosing [facility]       Thank you for choosing [facility] for your care. Our goal is always to provide you with excellent care. Hearing back from our patients is one way we can continue to improve our services. Please take a few minutes to complete the written survey that you may receive in the mail after you visit with us. Thank you!        [employer] Information     [employer] lets you send messages to your doctor, view your test results, renew your prescriptions, schedule appointments and more. To sign up, go to [email or web address] . Click on \"Log in\" on the left side of the screen, which will take you to the Welcome page. Then click on \"Sign up Now\" on the right side of the page.     You will be asked to enter " the access code listed below, as well as some personal information. Please follow the directions to create your username and password.     Your access code is: [email or web address]  Expires: 2018  1:12 AM     Your access code will  in 90 days. If you need help or a new code, please call your [place] clinic or [phone].        Care EveryWhere ID     This is your Care EveryWhere ID. This could be used by other organizations to access your [place] medical records  [id number]        Equal Access to Services     [facility]: Hadcherry Martin, yessica cr, cash messeralangelito og, estuardo chisholm . So [facility] [phone].    ATENCIÓN: Si habla español, tiene a diaz disposición servicios gratuitos de asistencia lingüística. Llame al [phone].    We comply with applicable federal civil rights laws and [place] laws. We do not discriminate on the basis of race, color, national origin, age, disability, sex, sexual orientation, or gender identity.            After Visit Summary       This is your record. Keep this with you and show to your community pharmacist(s) and doctor(s) at your next visit.

## 2021-07-03 NOTE — ED NOTES
Discharge instructions and new medications discussed and explained with Pt. Pt. Verbalized understanding and has no further questions or needs at this time.         Ava Solorzano RN  07/03/21 6022

## 2021-07-07 ENCOUNTER — HOSPITAL ENCOUNTER (EMERGENCY)
Age: 51
Discharge: HOME | End: 2021-07-07
Payer: COMMERCIAL

## 2021-07-07 VITALS
HEIGHT: 65 IN | OXYGEN SATURATION: 97 % | BODY MASS INDEX: 49.65 KG/M2 | HEART RATE: 84 BPM | RESPIRATION RATE: 16 BRPM | TEMPERATURE: 96.9 F | WEIGHT: 298 LBS | SYSTOLIC BLOOD PRESSURE: 149 MMHG | DIASTOLIC BLOOD PRESSURE: 75 MMHG

## 2021-07-07 DIAGNOSIS — R21 RASH AND OTHER NONSPECIFIC SKIN ERUPTION: Primary | ICD-10-CM

## 2021-07-07 PROCEDURE — 99213 OFFICE O/P EST LOW 20 MIN: CPT | Performed by: EMERGENCY MEDICINE

## 2021-07-07 PROCEDURE — 99213 OFFICE O/P EST LOW 20 MIN: CPT

## 2021-07-07 PROCEDURE — 6370000000 HC RX 637 (ALT 250 FOR IP): Performed by: EMERGENCY MEDICINE

## 2021-07-07 RX ORDER — PREDNISONE 10 MG/1
40 TABLET ORAL ONCE
Status: DISCONTINUED | OUTPATIENT
Start: 2021-07-07 | End: 2021-07-07

## 2021-07-07 RX ORDER — PREDNISONE 10 MG/1
40 TABLET ORAL ONCE
Status: COMPLETED | OUTPATIENT
Start: 2021-07-07 | End: 2021-07-07

## 2021-07-07 RX ORDER — PREDNISONE 20 MG/1
20 TABLET ORAL 2 TIMES DAILY
Qty: 10 TABLET | Refills: 0 | Status: SHIPPED | OUTPATIENT
Start: 2021-07-07 | End: 2021-07-12

## 2021-07-07 RX ADMIN — PREDNISONE 40 MG: 10 TABLET ORAL at 20:01

## 2021-07-07 ASSESSMENT — ENCOUNTER SYMPTOMS
SHORTNESS OF BREATH: 0
COUGH: 0

## 2021-07-08 NOTE — ED PROVIDER NOTES
[facility]       CHIEF COMPLAINT       Chief Complaint   Patient presents with    Rash     on legs now arms       Nurses Notes reviewed and I agree except as noted in the HPI. HISTORY OF PRESENT ILLNESS   Mary Grace Harris is a 45 y.o. male who presents for evaluation of rash. Patient states he has rash and some edema to both lower extremities it has been present for the past several weeks. He states that it is itchy. He reports the swelling to his lower extremities have been present for years. Patient denies any new medications. No exposures. Patient was seen in the emergency department 5 days ago. He was diagnosed with venous stasis dermatitis of both lower extremities and was advised to use VICTORIANO hose. Patient reports he has been using these and this has not been helping with his rash. He states the rash has been very itchy. He has been trying Neosporin to the area with no improvement of his symptoms. HPI    REVIEW OF SYSTEMS     Review of Systems   Constitutional: Negative for fatigue and fever. Respiratory: Negative for cough and shortness of breath. Cardiovascular: Positive for leg swelling. Negative for chest pain. Genitourinary: Negative for difficulty urinating and flank pain. Skin: Positive for rash (bilateral lower extremities). Neurological: Negative for weakness. Psychiatric/Behavioral: Negative for behavioral problems. PAST MEDICAL HISTORY         Diagnosis Date    High cholesterol     Low testosterone 2020    dr Mark Verdugo     Sleep apnea        SURGICALHISTORY     Patient  has no past surgical history on file.     CURRENT MEDICATIONS       Discharge Medication List as of 7/7/2021  8:03 PM      CONTINUE these medications which have NOT CHANGED    Details   omeprazole (PRILOSEC) 40 MG delayed release capsule Take 40 mg by mouth dailyHistorical Med      ZINC PO Take by mouth daily Historical Med      rosuvastatin (CRESTOR) 5 MG tablet take 1 tablet by mouth once daily, Disp-90 tablet, R-1Normal      testosterone (ANDROGEL) 25 MG/2.5GM (1%) GEL 1 % gel Apply 2.5 g topically daily for 30 days. , Disp-75 g, R-5Print      Cholecalciferol (VITAMIN D) 50 MCG (2000 UT) CAPS capsule Take 1 capsule by mouth daily, Disp-90 capsule,R-1Normal      CPAP Machine MISC Starting Wed 2/12/2020, Disp-1 each, R-0, PrintPlease change CPAP pressure to auto 10-20 cm H20. ALLERGIES     Patient is has No Known Allergies. Patients   Immunization History   Administered Date(s) Administered    Tdap (Boostrix, Adacel) 09/28/2017       FAMILY HISTORY     Patient's family history is not on file. SOCIAL HISTORY     Patient  reports that he is a non-smoker but has been exposed to tobacco smoke. He has never used smokeless tobacco. He reports that he does not drink alcohol and does not use drugs. PHYSICAL EXAM     ED TRIAGE VITALS  BP: (!) 149/75, Temp: 96.9 °F (36.1 °C), Pulse: 84, Resp: 16, SpO2: 97 %,Estimated body mass index is 49.59 kg/m² as calculated from the following:    Height as of this encounter: 5' 5\" (1.651 m). Weight as of this encounter: 298 lb (135.2 kg). ,No LMP for male patient. Physical Exam  Constitutional:       General: He is not in acute distress. Appearance: He is normal weight. He is not ill-appearing. HENT:      Head: Normocephalic. Nose: Nose normal.      Mouth/Throat:      Mouth: Mucous membranes are moist.   Cardiovascular:      Rate and Rhythm: Normal rate. Pulses: Normal pulses. Pulmonary:      Effort: Pulmonary effort is normal.   Skin:     General: Skin is warm and dry. Capillary Refill: Capillary refill takes less than 2 seconds. Comments: Bilateral lower leg edema that appears chronic. Patient has red, papular rash to anterior shins of both legs. Patient reports the area is pruritic   Neurological:      General: No focal deficit present. Mental Status: He is alert. Psychiatric:         Mood and Affect: Mood normal.         Behavior: Behavior normal.         DIAGNOSTIC RESULTS     Labs:No results found for this visit on 07/07/21. IMAGING:    No orders to display         EKG:      URGENT CARE COURSE:     Vitals:    07/07/21 1950   BP: (!) 149/75   Pulse: 84   Resp: 16   Temp: 96.9 °F (36.1 °C)   TempSrc: Temporal   SpO2: 97%   Weight: 298 lb (135.2 kg)   Height: 5' 5\" (1.651 m)       Medications   predniSONE (DELTASONE) tablet 40 mg (40 mg Oral Given 7/7/21 2001)            PROCEDURES:  None    FINAL IMPRESSION      1. Rash and other nonspecific skin eruption          DISPOSITION/ PLAN   Patient presents for bilateral lower leg rash. Patient has no improvement with treatment prescribed at emergency department 5 days ago. Undetermined etiology. I will place patient on burst of steroids. First dose given in the urgent care. We will also place patient on Mycolog-II cream to see if he has benefit from this medication. Patient is advised follow-up primary care provider if no improvement 3 to 5 days. Sooner for increased redness, swelling, temperature 100.5 or greater, posterior leg pain, shortness of breath, chest pain or new concerns. PATIENT REFERRED TO:  ARIK Dupree CNP  [address] [facility] [id number]      DISCHARGE MEDICATIONS:  Discharge Medication List as of 7/7/2021  8:03 PM      START taking these medications    Details   nystatin-triamcinolone (MYCOLOG II) 878839-8.1 UNIT/GM-% cream Apply topically 4 times daily. , Disp-1 Tube, R-0, Normal      predniSONE (DELTASONE) 20 MG tablet Take 1 tablet by mouth 2 times daily for 5 days, Disp-10 tablet, R-0Normal             Discharge Medication List as of 7/7/2021  8:03 PM          Discharge Medication List as of 7/7/2021  8:03 PM          ARIK Mcintosh CNP    (Please note that portions of this note were completed with a voice recognition program. Efforts were made to edit the dictations but occasionally words are mis-transcribed.)          Mary Kate Harris, APRN - CNP  07/07/21 2010

## 2021-07-20 ENCOUNTER — TELEPHONE (OUTPATIENT)
Dept: PULMONOLOGY | Age: 51
End: 2021-07-20

## 2021-07-20 DIAGNOSIS — G47.33 OBSTRUCTIVE SLEEP APNEA: Primary | ICD-10-CM

## 2021-07-20 NOTE — TELEPHONE ENCOUNTER
Patient called asking if he can get supply's since his is leaking, He hasn't wore it the last two weeks. patient was last seen with you back in Feb 2021.  I scanned in the download from [address] [place] [address]

## 2021-10-29 ENCOUNTER — OFFICE VISIT (OUTPATIENT)
Dept: PULMONOLOGY | Age: 51
End: 2021-10-29
Payer: MEDICAID

## 2021-10-29 VITALS
DIASTOLIC BLOOD PRESSURE: 80 MMHG | HEART RATE: 67 BPM | HEIGHT: 65 IN | SYSTOLIC BLOOD PRESSURE: 128 MMHG | WEIGHT: 291.8 LBS | BODY MASS INDEX: 48.62 KG/M2 | OXYGEN SATURATION: 98 % | TEMPERATURE: 97.7 F

## 2021-10-29 DIAGNOSIS — G47.10 HYPERSOMNIA: ICD-10-CM

## 2021-10-29 DIAGNOSIS — G47.33 OBSTRUCTIVE SLEEP APNEA: Primary | ICD-10-CM

## 2021-10-29 DIAGNOSIS — E66.01 MORBID OBESITY WITH BMI OF 45.0-49.9, ADULT (HCC): ICD-10-CM

## 2021-10-29 PROCEDURE — G8484 FLU IMMUNIZE NO ADMIN: HCPCS | Performed by: PHYSICIAN ASSISTANT

## 2021-10-29 PROCEDURE — 3017F COLORECTAL CA SCREEN DOC REV: CPT | Performed by: PHYSICIAN ASSISTANT

## 2021-10-29 PROCEDURE — G8417 CALC BMI ABV UP PARAM F/U: HCPCS | Performed by: PHYSICIAN ASSISTANT

## 2021-10-29 PROCEDURE — G8427 DOCREV CUR MEDS BY ELIG CLIN: HCPCS | Performed by: PHYSICIAN ASSISTANT

## 2021-10-29 PROCEDURE — 1036F TOBACCO NON-USER: CPT | Performed by: PHYSICIAN ASSISTANT

## 2021-10-29 PROCEDURE — 99214 OFFICE O/P EST MOD 30 MIN: CPT | Performed by: PHYSICIAN ASSISTANT

## 2021-10-29 ASSESSMENT — ENCOUNTER SYMPTOMS
BACK PAIN: 0
WHEEZING: 0
NAUSEA: 0
CHEST TIGHTNESS: 0
COUGH: 0
STRIDOR: 0
SHORTNESS OF BREATH: 0
ALLERGIC/IMMUNOLOGIC NEGATIVE: 1
EYES NEGATIVE: 1
DIARRHEA: 0

## 2021-10-29 NOTE — PROGRESS NOTES
Exam:    BMI:  Body mass index is 48.56 kg/m². Wt Readings from Last 3 Encounters:   10/29/21 291 lb 12.8 oz (132.4 kg)   07/07/21 298 lb (135.2 kg)   07/02/21 (!) 303 lb (137.4 kg)     Weight stable / unchanged  Vitals: /80 (Site: Left Upper Arm, Position: Sitting, Cuff Size: Large Adult)   Pulse 67   Temp 97.7 °F (36.5 °C) (Temporal)   Ht 5' 5\" (1.651 m)   Wt 291 lb 12.8 oz (132.4 kg)   SpO2 98%   BMI 48.56 kg/m²       Physical Exam  Constitutional:       Appearance: He is well-developed. HENT:      Head: Normocephalic and atraumatic. Right Ear: External ear normal.      Left Ear: External ear normal.   Eyes:      Conjunctiva/sclera: Conjunctivae normal.      Pupils: Pupils are equal, round, and reactive to light. Cardiovascular:      Rate and Rhythm: Normal rate and regular rhythm. Heart sounds: Normal heart sounds. Pulmonary:      Effort: Pulmonary effort is normal.      Breath sounds: Normal breath sounds. Musculoskeletal:         General: Normal range of motion. Cervical back: Normal range of motion and neck supple. Skin:     General: Skin is warm and dry. Neurological:      Mental Status: He is alert and oriented to person, place, and time. Psychiatric:         Behavior: Behavior normal.         Thought Content: Thought content normal.         Judgment: Judgment normal.           ASSESSMENT/DIAGNOSIS     Diagnosis Orders   1. Obstructive sleep apnea     2. Hypersomnia     3. Morbid obesity with BMI of 45.0-49.9, adult [facility])              Plan   Do you need any equipment today? 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    - He  advised to keep good compliance with current recommended pressure to get optimal results and clinical improvement  - Recommend 7-9 hours of sleep with PAP  - He was advised to call DME company regarding supplies if needed.   -He call my office for earlier appointment if needed for worsening of sleep symptoms.   - He was instructed on weight loss  - Carmen Omalley was educated about my impression and plan. Patient verbalizesunderstanding.   We will see Bertha Jason back in: 4 months with download    Information added by my medical assistant/LPN was reviewed today         Ariel Schilder PA-C, MPAS  10/29/2021

## 2021-12-01 ENCOUNTER — HOSPITAL ENCOUNTER (EMERGENCY)
Age: 51
Discharge: HOME | End: 2021-12-01
Payer: MEDICAID

## 2021-12-01 VITALS
OXYGEN SATURATION: 97 % | HEART RATE: 75 BPM | TEMPERATURE: 98 F | DIASTOLIC BLOOD PRESSURE: 60 MMHG | WEIGHT: 286.38 LBS | SYSTOLIC BLOOD PRESSURE: 122 MMHG | BODY MASS INDEX: 47.66 KG/M2 | RESPIRATION RATE: 16 BRPM

## 2021-12-01 DIAGNOSIS — Z20.822 EXPOSURE TO COVID-19 VIRUS: Primary | ICD-10-CM

## 2021-12-01 PROCEDURE — 99213 OFFICE O/P EST LOW 20 MIN: CPT | Performed by: NURSE PRACTITIONER

## 2021-12-01 PROCEDURE — 99213 OFFICE O/P EST LOW 20 MIN: CPT

## 2021-12-01 ASSESSMENT — PAIN SCALES - GENERAL: PAINLEVEL_OUTOF10: 2

## 2021-12-01 ASSESSMENT — ENCOUNTER SYMPTOMS
VOMITING: 0
EYE ITCHING: 0
WHEEZING: 0
SINUS PRESSURE: 0
ABDOMINAL PAIN: 0
NAUSEA: 0
DIARRHEA: 0
SHORTNESS OF BREATH: 0
SORE THROAT: 1
EYE REDNESS: 0
COUGH: 0

## 2021-12-01 ASSESSMENT — PAIN DESCRIPTION - FREQUENCY: FREQUENCY: CONTINUOUS

## 2021-12-01 ASSESSMENT — PAIN DESCRIPTION - LOCATION: LOCATION: THROAT

## 2021-12-01 ASSESSMENT — PAIN DESCRIPTION - ONSET: ONSET: PROGRESSIVE

## 2021-12-01 ASSESSMENT — PAIN DESCRIPTION - DESCRIPTORS: DESCRIPTORS: ACHING

## 2021-12-01 ASSESSMENT — PAIN DESCRIPTION - PAIN TYPE: TYPE: ACUTE PAIN

## 2021-12-01 ASSESSMENT — PAIN DESCRIPTION - PROGRESSION: CLINICAL_PROGRESSION: NOT CHANGED

## 2021-12-01 ASSESSMENT — PAIN - FUNCTIONAL ASSESSMENT: PAIN_FUNCTIONAL_ASSESSMENT: ACTIVITIES ARE NOT PREVENTED

## 2021-12-02 NOTE — ED TRIAGE NOTES
Pt to room 2 with c/o a sore throat that started about 10 minutes ago when he pulled in his driveway. He reports that he brought someone to the urgent care yesterday who tested positive for Covid.

## 2021-12-02 NOTE — ED PROVIDER NOTES
[address]       Chief Complaint   Patient presents with    Pharyngitis     x 10 minutes       Nurses Notes reviewed and I agree except as noted in the HPI. HISTORY OF PRESENT ILLNESS   Mariano Nguyễn is a 45 y.o. male who presents for evaluation. He states that his throat has been sore for 10 minutes   He thinks that he may have been exposed to COVID-19 yesterday. The patient/patient representative has no other acute complaints at this time. REVIEW OF SYSTEMS     Review of Systems   Constitutional: Negative for chills, fatigue and fever. HENT: Positive for sore throat. Negative for congestion, ear pain and sinus pressure. Eyes: Negative for redness and itching. Respiratory: Negative for cough, shortness of breath and wheezing. Cardiovascular: Negative for chest pain and palpitations. Gastrointestinal: Negative for abdominal pain, diarrhea, nausea and vomiting. Skin: Negative for rash. Allergic/Immunologic: Negative for environmental allergies and food allergies. Neurological: Negative for headaches. PAST MEDICAL HISTORY         Diagnosis Date    High cholesterol     Low testosterone 2020    dr Sasha Nicholson Sleep apnea        SURGICAL HISTORY     Patient  has no past surgical history on file. CURRENT MEDICATIONS       Discharge Medication List as of 12/1/2021  7:21 PM      CONTINUE these medications which have NOT CHANGED    Details   rosuvastatin (CRESTOR) 5 MG tablet take 1 tablet by mouth once daily, Disp-90 tablet, R-3Normal      Cholecalciferol (VITAMIN D3) 50 MCG (2000 UT) CAPS take 1 capsule by mouth once daily, Disp-90 capsule, R-3Normal      omeprazole (PRILOSEC) 40 MG delayed release capsule Take 40 mg by mouth dailyHistorical Med      ZINC PO Take by mouth daily Historical Med      CPAP Machine MISC Starting Wed 2/12/2020, Disp-1 each, R-0, PrintPlease change CPAP pressure to auto 10-20 cm H20. nystatin-triamcinolone (MYCOLOG II) 680820-6.1 UNIT/GM-% cream Apply topically 4 times daily. , Disp-1 Tube, R-0, Normal      testosterone (ANDROGEL) 25 MG/2.5GM (1%) GEL 1 % gel Apply 2.5 g topically daily for 30 days. , Disp-75 g, R-5Print             ALLERGIES     Patient is has No Known Allergies. FAMILY HISTORY     Patient'sfamily history is not on file. SOCIAL HISTORY     Patient  reports that he is a non-smoker but has been exposed to tobacco smoke. He has never used smokeless tobacco. He reports that he does not drink alcohol and does not use drugs. PHYSICAL EXAM     ED TRIAGE VITALS  BP: 122/60, Temp: 98 °F (36.7 °C), Pulse: 75, Resp: 16, SpO2: 97 %  Physical Exam  Vitals and nursing note reviewed. Constitutional:       General: He is not in acute distress. Appearance: Normal appearance. He is well-developed and well-groomed. HENT:      Head: Normocephalic and atraumatic. Right Ear: External ear normal.      Left Ear: External ear normal.      Nose: Nose normal.      Mouth/Throat:      Lips: Pink. Mouth: Mucous membranes are moist.      Pharynx: Oropharynx is clear. Eyes:      Conjunctiva/sclera:      Right eye: Right conjunctiva is not injected. Left eye: Left conjunctiva is not injected. Pupils: Pupils are equal.   Cardiovascular:      Rate and Rhythm: Normal rate. Heart sounds: Normal heart sounds. Pulmonary:      Effort: Pulmonary effort is normal. No respiratory distress. Breath sounds: Normal breath sounds and air entry. Musculoskeletal:      Cervical back: Normal range of motion. Lymphadenopathy:      Cervical: No cervical adenopathy. Skin:     General: Skin is warm and dry. Findings: No rash (on exposed surfaces). Neurological:      Mental Status: He is alert and oriented to person, place, and time. Psychiatric:         Mood and Affect: Mood normal.         Speech: Speech normal.         Behavior: Behavior is cooperative. DIAGNOSTIC RESULTS   Labs:  Abnormal Labs Reviewed - No data to display     IMAGING:  No orders to display     URGENT CARE COURSE:     Vitals:    12/01/21 1906   BP: 122/60   Pulse: 75   Resp: 16   Temp: 98 °F (36.7 °C)   TempSrc: Temporal   SpO2: 97%   Weight: 286 lb 6 oz (129.9 kg)       Medications - No data to display  PROCEDURES:  FINALIMPRESSION      1. Exposure to COVID-19 virus        DISPOSITION/PLAN   DISPOSITION Decision To Discharge 12/01/2021 07:20:33 PM  Physical exam is unremarkable. COVID-19 testing declined patient states that he will get swab next week. Physical assessment findings, diagnostic testing(s) if applicable, and vital signs reviewed with patient/patient representative. If applicable, patient/patient representative will be contacted upon receipt of final culture and sensitivity or other testing results when available. Any additions or changes to medications or changes the plan of care will be made at that time. Differential diagnosis(s) discussed with patient/patient representative. Patient is to follow-up with family care provider in 2-3 days if no improvement. Patient is to go to the emergency department if symptoms change/worsen. Patient/patient representative is aware of care plan, questions answered, verbalizes understanding and is in agreement. Printed instructions attached to after visit summary. Problem List Items Addressed This Visit     None      Visit Diagnoses     Exposure to COVID-19 virus    -  Primary          PATIENT REFERRED TO:  ARIK Severino CNP  [address] [place] [address] [address] [address]  [phone]    Schedule an appointment as soon as possible for a visit in 3 days  For further evaluation. , If symptoms change/worsen, go to the [id number] [facility], APRN - CNP    Please note that some or all of this chart was generated using [employer] voice recognition software.  Although every effort was made to ensure the accuracy of this automated transcription, some errors in transcription may have occurred.         Shilo Brice, ARIK - SUSANNA  12/01/21 1930

## 2022-04-07 ENCOUNTER — OFFICE VISIT (OUTPATIENT)
Dept: PULMONOLOGY | Age: 52
End: 2022-04-07
Payer: MEDICAID

## 2022-04-07 VITALS
HEIGHT: 65 IN | BODY MASS INDEX: 48.42 KG/M2 | TEMPERATURE: 97.8 F | SYSTOLIC BLOOD PRESSURE: 132 MMHG | WEIGHT: 290.6 LBS | HEART RATE: 71 BPM | DIASTOLIC BLOOD PRESSURE: 82 MMHG | OXYGEN SATURATION: 98 %

## 2022-04-07 DIAGNOSIS — R06.83 SNORING: ICD-10-CM

## 2022-04-07 DIAGNOSIS — G47.33 OBSTRUCTIVE SLEEP APNEA: Primary | ICD-10-CM

## 2022-04-07 DIAGNOSIS — G47.10 HYPERSOMNIA: ICD-10-CM

## 2022-04-07 DIAGNOSIS — E66.01 MORBID OBESITY WITH BMI OF 45.0-49.9, ADULT (HCC): ICD-10-CM

## 2022-04-07 PROCEDURE — 99214 OFFICE O/P EST MOD 30 MIN: CPT | Performed by: PHYSICIAN ASSISTANT

## 2022-04-07 PROCEDURE — G8417 CALC BMI ABV UP PARAM F/U: HCPCS | Performed by: PHYSICIAN ASSISTANT

## 2022-04-07 PROCEDURE — 3017F COLORECTAL CA SCREEN DOC REV: CPT | Performed by: PHYSICIAN ASSISTANT

## 2022-04-07 PROCEDURE — G8427 DOCREV CUR MEDS BY ELIG CLIN: HCPCS | Performed by: PHYSICIAN ASSISTANT

## 2022-04-07 PROCEDURE — 1036F TOBACCO NON-USER: CPT | Performed by: PHYSICIAN ASSISTANT

## 2022-04-07 ASSESSMENT — ENCOUNTER SYMPTOMS
ALLERGIC/IMMUNOLOGIC NEGATIVE: 1
NAUSEA: 0
BACK PAIN: 0
STRIDOR: 0
CHEST TIGHTNESS: 0
WHEEZING: 0
EYES NEGATIVE: 1
SHORTNESS OF BREATH: 0
COUGH: 0
DIARRHEA: 0

## 2022-04-07 NOTE — PROGRESS NOTES
[facility]      Vick Mujica         [id number]  4/7/2022   Chief Complaint   Patient presents with    Follow-up     MICHAELA 4 month with download         Pt of Dr. Hanane LOPEZ Download:   Original or initial AHI: 16.2     Date of initial study: 10/10/2015      Compliant  23%     Noncompliant 37 %     PAP Type Airsense 10 autoset Level  10/20   Avg Hrs/Day 3 hr 17 min  AHI: 1.7   Recorded compliance dates , 3/6/2022  to 4/4/2022   Machine/Mfg:   [x] ResMed    [] Respironics/Dreamstation   Interface:   [x] Nasal    [] Nasal pillows   [] FFM      Provider:      [] -GENEVIEVE     []Brittany     [] Joan    [] Reno Lorenz    [] [place]               [] [employer]      [] Adaptive    [] [address]      [x] Other    Neck Size: 16.25  Mallampati Mallampati 4  ESS:  4  SAQLI: 67    Here is a scan of the most recent download:          Presentation:   Pauline Dunn presents for sleep medicine follow up for obstructive sleep apnea  Since the last visit, Pauline Dunn is still struggling with compliance. He states his mask limits compliance, it is not fitting well. He sleeps better with PAP. Equipment issues: The pressure is  acceptable, the mask is unacceptable     Sleep issues:  Do you feel better? No  More rested? No   Better concentration? no    Progress History:   Since last visit any new medical issues? No  New ER or hospital visits? No  Any new or changes in medicines? No  Any new sleep medicines? No    Review of Systems -   Review of Systems   Constitutional: Negative for activity change, appetite change, chills and fever. HENT: Negative for congestion and postnasal drip. Eyes: Negative. Respiratory: Negative for cough, chest tightness, shortness of breath, wheezing and stridor. Cardiovascular: Negative for chest pain and leg swelling. Gastrointestinal: Negative for diarrhea and nausea. Endocrine: Negative. Genitourinary: Negative. Musculoskeletal: Negative. Negative for arthralgias and back pain. Skin: Negative. Allergic/Immunologic: Negative. Neurological: Negative. Negative for dizziness and light-headedness. Psychiatric/Behavioral: Negative. All other systems reviewed and are negative. Physical Exam:    BMI:  Body mass index is 48.36 kg/m². Wt Readings from Last 3 Encounters:   04/07/22 290 lb 9.6 oz (131.8 kg)   12/01/21 286 lb 6 oz (129.9 kg)   10/29/21 291 lb 12.8 oz (132.4 kg)     Weight stable / unchanged  Vitals: /82 (Site: Left Upper Arm, Position: Sitting, Cuff Size: Large Adult)   Pulse 71   Temp 97.8 °F (36.6 °C) (Temporal)   Ht 5' 5\" (1.651 m)   Wt 290 lb 9.6 oz (131.8 kg)   SpO2 98%   BMI 48.36 kg/m²       Physical Exam  Constitutional:       Appearance: Normal appearance. He is normal weight. HENT:      Head: Normocephalic and atraumatic. Right Ear: External ear normal.      Left Ear: External ear normal.      Nose: Nose normal.   Eyes:      Extraocular Movements: Extraocular movements intact. Conjunctiva/sclera: Conjunctivae normal.      Pupils: Pupils are equal, round, and reactive to light. Pulmonary:      Effort: Pulmonary effort is normal.   Musculoskeletal:      Cervical back: Normal range of motion and neck supple. Neurological:      General: No focal deficit present. Mental Status: He is alert and oriented to person, place, and time. Psychiatric:         Attention and Perception: Attention and perception normal.         Mood and Affect: Mood and affect normal.         Speech: Speech normal.         Behavior: Behavior normal. Behavior is cooperative. Thought Content: Thought content normal.         Cognition and Memory: Cognition normal.         Judgment: Judgment normal.           ASSESSMENT/DIAGNOSIS     Diagnosis Orders   1. Obstructive sleep apnea  DME Order for CPAP as OP   2. Hypersomnia     3. Morbid obesity with BMI of 45.0-49.9, adult ([facility] [address]     4.  Snoring            Plan Do you need any equipment today? Yes needs mask fitting  - need to improve mask fitting in order to improve compliance  - Hypersomnia and snoring will improve with improved compliance   - Download reviewed and discussed with patient  - He  was advised to continue current positive airway pressure therapy with above described pressure. - He  advised to keep good compliance with current recommended pressure to get optimal results and clinical improvement  - Recommend 7-9 hours of sleep with PAP  - He was advised to call [employer] company regarding supplies if needed.   -He call my office for earlier appointment if needed for worsening of sleep symptoms.   - He was instructed on weight loss  - Birdia Dubin was educated about my impression and plan. Patient verbalizesunderstanding.   We will see Harshad Vasquez back in: 4 months with download    Information added by my medical assistant/LPN was reviewed today         Paula Yates PA-C, MPAS  4/7/2022

## 2022-06-07 ENCOUNTER — HOSPITAL ENCOUNTER (EMERGENCY)
Age: 52
Discharge: HOME | End: 2022-06-07
Payer: MEDICAID

## 2022-06-07 VITALS
OXYGEN SATURATION: 96 % | HEART RATE: 81 BPM | TEMPERATURE: 97 F | BODY MASS INDEX: 47.93 KG/M2 | SYSTOLIC BLOOD PRESSURE: 134 MMHG | WEIGHT: 288 LBS | DIASTOLIC BLOOD PRESSURE: 70 MMHG | RESPIRATION RATE: 16 BRPM

## 2022-06-07 DIAGNOSIS — M54.50 BILATERAL LOW BACK PAIN WITHOUT SCIATICA, UNSPECIFIED CHRONICITY: ICD-10-CM

## 2022-06-07 DIAGNOSIS — E66.01 CLASS 3 SEVERE OBESITY WITH BODY MASS INDEX (BMI) OF 45.0 TO 49.9 IN ADULT, UNSPECIFIED OBESITY TYPE, UNSPECIFIED WHETHER SERIOUS COMORBIDITY PRESENT (HCC): ICD-10-CM

## 2022-06-07 DIAGNOSIS — S76.211A INGUINAL STRAIN, RIGHT, INITIAL ENCOUNTER: Primary | ICD-10-CM

## 2022-06-07 PROCEDURE — 99213 OFFICE O/P EST LOW 20 MIN: CPT | Performed by: NURSE PRACTITIONER

## 2022-06-07 PROCEDURE — 99213 OFFICE O/P EST LOW 20 MIN: CPT

## 2022-06-07 RX ORDER — IBUPROFEN 400 MG/1
400 TABLET ORAL EVERY 6 HOURS PRN
Qty: 30 TABLET | Refills: 0 | Status: SHIPPED | OUTPATIENT
Start: 2022-06-07

## 2022-06-07 RX ORDER — CYCLOBENZAPRINE HCL 10 MG
10 TABLET ORAL 3 TIMES DAILY PRN
Qty: 21 TABLET | Refills: 0 | Status: SHIPPED | OUTPATIENT
Start: 2022-06-07 | End: 2022-06-14

## 2022-06-07 ASSESSMENT — PAIN DESCRIPTION - FREQUENCY: FREQUENCY: INTERMITTENT

## 2022-06-07 ASSESSMENT — PAIN - FUNCTIONAL ASSESSMENT
PAIN_FUNCTIONAL_ASSESSMENT: ACTIVITIES ARE NOT PREVENTED
PAIN_FUNCTIONAL_ASSESSMENT: NONE - DENIES PAIN

## 2022-06-07 ASSESSMENT — PAIN DESCRIPTION - PAIN TYPE: TYPE: ACUTE PAIN

## 2022-06-07 ASSESSMENT — PAIN DESCRIPTION - ORIENTATION: ORIENTATION: LEFT

## 2022-06-07 ASSESSMENT — PAIN DESCRIPTION - DESCRIPTORS: DESCRIPTORS: ACHING

## 2022-06-07 NOTE — ED TRIAGE NOTES
Patient ambulated to rm. 8 c/o right hip pain x 4 months. Sharp turn more pain. No OTC pain med taken.

## 2022-06-07 NOTE — ED NOTES
advised to call back for any additional questions or concerns     Pt discharged in stable condition, ambulated to vehicle home by himself.        Terrie Amin LPN  74/86/10 [id number]

## 2022-06-08 ASSESSMENT — ENCOUNTER SYMPTOMS
APNEA: 0
ABDOMINAL SWELLING: 0
WHEEZING: 0
BACK PAIN: 1
COUGH: 0
CHOKING: 0
ABDOMINAL PAIN: 0
BOWEL INCONTINENCE: 0
CHEST TIGHTNESS: 0
COLOR CHANGE: 0
SHORTNESS OF BREATH: 0
STRIDOR: 0

## 2022-06-08 NOTE — ED PROVIDER NOTES
[place] [id number]  Urgent Care Encounter      CHIEF COMPLAINT       Chief Complaint   Patient presents with    Hip Pain     right hip pain , no injury       Nurses Notes reviewed and I agree except as noted in the HPI. HISTORY OFPRESENT ILLNESS   Alejandra Brambila is a 45 y.o. The history is provided by the patient. No  was used. Back Pain  Location:  Lumbar spine  Quality:  Stiffness and aching  Stiffness is present:  All day  Radiates to:  Does not radiate  Pain severity:  Mild  Pain is: Worse during the day  Onset quality:  Unable to specify  Timing:  Intermittent  Progression:  Waxing and waning  Chronicity:  Chronic  Context: physical stress and twisting    Context: not falling, not jumping from heights, not lifting heavy objects, not MCA, not MVA, not occupational injury, not pedestrian accident, not recent illness and not recent injury    Relieved by:  Nothing  Worsened by: Movement, bending, sitting and twisting  Ineffective treatments:  None tried  Associated symptoms: no abdominal pain, no abdominal swelling, no bladder incontinence, no bowel incontinence, no chest pain, no dysuria, no fever, no headaches, no leg pain, no numbness, no paresthesias, no pelvic pain, no perianal numbness, no tingling, no weakness and no weight loss    Risk factors: lack of exercise and obesity    Risk factors: no hx of cancer, no hx of osteoporosis, no menopause, not pregnant, no recent surgery, no steroid use and no vascular disease        REVIEW OF SYSTEMS     Review of Systems   Constitutional: Negative for activity change, appetite change, chills, diaphoresis, fatigue, fever and weight loss. Respiratory: Negative for apnea, cough, choking, chest tightness, shortness of breath, wheezing and stridor. Cardiovascular: Negative for chest pain, palpitations and leg swelling. Gastrointestinal: Negative for abdominal pain and bowel incontinence.    Genitourinary: Negative for bladder incontinence, decreased urine volume, difficulty urinating, dysuria, flank pain, pelvic pain and urgency. Musculoskeletal: Positive for back pain and myalgias. Skin: Negative for color change, pallor, rash and wound. Neurological: Negative for dizziness, tingling, weakness, numbness, headaches and paresthesias. PAST MEDICAL HISTORY         Diagnosis Date    High cholesterol     Low testosterone 2020    dr Betty Pool Sleep apnea        SURGICAL HISTORY     Patient  has no past surgical history on file. CURRENT MEDICATIONS       Discharge Medication List as of 6/7/2022  5:52 PM      CONTINUE these medications which have NOT CHANGED    Details   rosuvastatin (CRESTOR) 5 MG tablet take 1 tablet by mouth once daily, Disp-90 tablet, R-3Normal      Cholecalciferol (VITAMIN D3) 50 MCG (2000 UT) CAPS take 1 capsule by mouth once daily, Disp-90 capsule, R-3Normal      omeprazole (PRILOSEC) 40 MG delayed release capsule Take 40 mg by mouth dailyHistorical Med      ZINC PO Take by mouth daily Historical Med      testosterone (ANDROGEL) 25 MG/2.5GM (1%) GEL 1 % gel Apply 2.5 g topically daily for 30 days. , Disp-75 g, R-5Print      CPAP Machine MISC Starting Wed 2/12/2020, Disp-1 each, R-0, PrintPlease change CPAP pressure to auto 10-20 cm H20. ALLERGIES     Patient is has No Known Allergies. FAMILY HISTORY     Patient's family history includes High Blood Pressure in his mother. SOCIAL HISTORY     Patient  reports that he is a non-smoker but has been exposed to tobacco smoke. He has never used smokeless tobacco. He reports that he does not drink alcohol and does not use drugs. PHYSICAL EXAM     ED TRIAGE VITALS  BP: 134/70, Temp: 97 °F (36.1 °C), Heart Rate: 81, Resp: 16, SpO2: 96 %  Physical Exam  Vitals and nursing note reviewed. Constitutional:       General: He is not in acute distress. Appearance: Normal appearance. He is obese.  He is not ill-appearing, toxic-appearing or diaphoretic. HENT:      Head: Normocephalic and atraumatic. Right Ear: External ear normal.      Left Ear: External ear normal.   Eyes:      Extraocular Movements: Extraocular movements intact. Conjunctiva/sclera: Conjunctivae normal.   Cardiovascular:      Pulses: Normal pulses. Dorsalis pedis pulses are 2+ on the right side and 2+ on the left side. Posterior tibial pulses are 2+ on the right side and 2+ on the left side. Musculoskeletal:         General: Normal range of motion. Cervical back: Normal range of motion. Legs:    Skin:     General: Skin is warm. Neurological:      General: No focal deficit present. Mental Status: He is alert and oriented to person, place, and time. Psychiatric:         Mood and Affect: Mood normal.         Behavior: Behavior normal.         Thought Content: Thought content normal.         Judgment: Judgment normal.         DIAGNOSTIC RESULTS   Labs:No results found for this visit on 06/07/22. IMAGING:  No orders to display     URGENT CARE COURSE:     Vitals:    06/07/22 1725   BP: 134/70   Pulse: 81   Resp: 16   Temp: 97 °F (36.1 °C)   TempSrc: Temporal   SpO2: 96%   Weight: 288 lb (130.6 kg)       Medications - No data to display  PROCEDURES:  None  FINAL IMPRESSION      1. Inguinal strain, right, initial encounter    2. Bilateral low back pain without sciatica, unspecified chronicity        DISPOSITION/PLAN   Decision To Discharge       The patient will be instructed to continue taking anti-inflammatory medication, and given a Rx for short course of muscle relaxer's. Rest,Ice 15-20 minutes TID x 2 days,Then Heat 15-20 minutes TID as needed The patient will be given back stretching exercises, and instructed to follow up with their PCP or community clinic for further evaluation.      The patient should return to the ED if the back pain worsens, or if they experience incontinence, numbness or tingling in the legs, or inability to ambulate. The patient is in agreement with this plan. The patient tolerated their visit well. The patient and / or the family were informed of the results of any tests, a time was given to answer questions, a plan was proposed and they agreed with plan. Follow up with PCP ×2-3 days for reevaluation and further management of care. PATIENT REFERRED TO:  ARIK Burris CNP  [address] [place] [address] [address] [facility]  [phone]    Schedule an appointment as soon as possible for a visit   nutrition and physical therapy    DISCHARGE MEDICATIONS:  Discharge Medication List as of 6/7/2022  5:52 PM      START taking these medications    Details   ibuprofen (IBU) 400 MG tablet Take 1 tablet by mouth every 6 hours as needed for Pain, Disp-30 tablet, R-0Normal      cyclobenzaprine (FLEXERIL) 10 mg tablet Take 1 tablet by mouth 3 times daily as needed for Muscle spasms, Disp-21 tablet, R-0Normal           Discharge Medication List as of 6/7/2022  5:52 PM          ARIK Cameron CNP, APRN - CNP  06/08/22 5424

## 2022-08-07 DIAGNOSIS — E55.9 VITAMIN D DEFICIENCY: ICD-10-CM

## 2022-08-08 ENCOUNTER — OFFICE VISIT (OUTPATIENT)
Dept: PULMONOLOGY | Age: 52
End: 2022-08-08
Payer: MEDICAID

## 2022-08-08 ENCOUNTER — OFFICE VISIT (OUTPATIENT)
Dept: FAMILY MEDICINE CLINIC | Age: 52
End: 2022-08-08
Payer: MEDICAID

## 2022-08-08 VITALS
WEIGHT: 284 LBS | RESPIRATION RATE: 16 BRPM | HEART RATE: 75 BPM | SYSTOLIC BLOOD PRESSURE: 110 MMHG | BODY MASS INDEX: 47.26 KG/M2 | DIASTOLIC BLOOD PRESSURE: 76 MMHG | OXYGEN SATURATION: 94 % | TEMPERATURE: 97.4 F

## 2022-08-08 VITALS
TEMPERATURE: 98.2 F | BODY MASS INDEX: 47.75 KG/M2 | HEART RATE: 82 BPM | DIASTOLIC BLOOD PRESSURE: 82 MMHG | OXYGEN SATURATION: 98 % | HEIGHT: 65 IN | SYSTOLIC BLOOD PRESSURE: 124 MMHG | WEIGHT: 286.6 LBS

## 2022-08-08 DIAGNOSIS — Z12.11 SCREENING FOR COLORECTAL CANCER: ICD-10-CM

## 2022-08-08 DIAGNOSIS — E78.2 ELEVATED TRIGLYCERIDES WITH HIGH CHOLESTEROL: ICD-10-CM

## 2022-08-08 DIAGNOSIS — R06.83 SNORING: ICD-10-CM

## 2022-08-08 DIAGNOSIS — Z00.00 ENCOUNTER FOR WELL ADULT EXAM WITHOUT ABNORMAL FINDINGS: Primary | ICD-10-CM

## 2022-08-08 DIAGNOSIS — G47.10 HYPERSOMNIA: ICD-10-CM

## 2022-08-08 DIAGNOSIS — E66.01 MORBID OBESITY WITH BMI OF 45.0-49.9, ADULT (HCC): ICD-10-CM

## 2022-08-08 DIAGNOSIS — E55.9 VITAMIN D DEFICIENCY: ICD-10-CM

## 2022-08-08 DIAGNOSIS — R07.89 STERNAL PAIN: ICD-10-CM

## 2022-08-08 DIAGNOSIS — G47.33 OBSTRUCTIVE SLEEP APNEA: Primary | ICD-10-CM

## 2022-08-08 DIAGNOSIS — Z12.12 SCREENING FOR COLORECTAL CANCER: ICD-10-CM

## 2022-08-08 PROCEDURE — 3017F COLORECTAL CA SCREEN DOC REV: CPT | Performed by: PHYSICIAN ASSISTANT

## 2022-08-08 PROCEDURE — G8427 DOCREV CUR MEDS BY ELIG CLIN: HCPCS | Performed by: PHYSICIAN ASSISTANT

## 2022-08-08 PROCEDURE — G8417 CALC BMI ABV UP PARAM F/U: HCPCS | Performed by: PHYSICIAN ASSISTANT

## 2022-08-08 PROCEDURE — 1036F TOBACCO NON-USER: CPT | Performed by: PHYSICIAN ASSISTANT

## 2022-08-08 PROCEDURE — 99396 PREV VISIT EST AGE 40-64: CPT | Performed by: NURSE PRACTITIONER

## 2022-08-08 PROCEDURE — 99213 OFFICE O/P EST LOW 20 MIN: CPT | Performed by: PHYSICIAN ASSISTANT

## 2022-08-08 RX ORDER — ACETAMINOPHEN 160 MG
TABLET,DISINTEGRATING ORAL
Qty: 90 CAPSULE | Refills: 3 | Status: SHIPPED | OUTPATIENT
Start: 2022-08-08

## 2022-08-08 RX ORDER — GLUCOSAMINE/CHONDR SU A SOD 750-600 MG
TABLET ORAL
Qty: 90 CAPSULE | Refills: 3 | OUTPATIENT
Start: 2022-08-08

## 2022-08-08 RX ORDER — ROSUVASTATIN CALCIUM 5 MG/1
TABLET, COATED ORAL
Qty: 90 TABLET | Refills: 3 | Status: SHIPPED | OUTPATIENT
Start: 2022-08-08

## 2022-08-08 SDOH — ECONOMIC STABILITY: FOOD INSECURITY: WITHIN THE PAST 12 MONTHS, THE FOOD YOU BOUGHT JUST DIDN'T LAST AND YOU DIDN'T HAVE MONEY TO GET MORE.: NEVER TRUE

## 2022-08-08 SDOH — ECONOMIC STABILITY: FOOD INSECURITY: WITHIN THE PAST 12 MONTHS, YOU WORRIED THAT YOUR FOOD WOULD RUN OUT BEFORE YOU GOT MONEY TO BUY MORE.: NEVER TRUE

## 2022-08-08 ASSESSMENT — ENCOUNTER SYMPTOMS
STRIDOR: 0
BACK PAIN: 0
SHORTNESS OF BREATH: 0
EYE REDNESS: 0
DIARRHEA: 0
COUGH: 0
ABDOMINAL PAIN: 0
DIARRHEA: 0
NAUSEA: 0
CHEST TIGHTNESS: 0
EYE DISCHARGE: 0
WHEEZING: 0
COUGH: 0
EYE PAIN: 0
BACK PAIN: 0
WHEEZING: 0
CONSTIPATION: 0
SORE THROAT: 0
SHORTNESS OF BREATH: 0
VOMITING: 0
NAUSEA: 0
RHINORRHEA: 0
ALLERGIC/IMMUNOLOGIC NEGATIVE: 1
ALLERGIC/IMMUNOLOGIC NEGATIVE: 1
TROUBLE SWALLOWING: 0
EYES NEGATIVE: 1

## 2022-08-08 ASSESSMENT — PATIENT HEALTH QUESTIONNAIRE - PHQ9
1. LITTLE INTEREST OR PLEASURE IN DOING THINGS: 0
SUM OF ALL RESPONSES TO PHQ QUESTIONS 1-9: 0
2. FEELING DOWN, DEPRESSED OR HOPELESS: 0
SUM OF ALL RESPONSES TO PHQ9 QUESTIONS 1 & 2: 0

## 2022-08-08 ASSESSMENT — SOCIAL DETERMINANTS OF HEALTH (SDOH): HOW HARD IS IT FOR YOU TO PAY FOR THE VERY BASICS LIKE FOOD, HOUSING, MEDICAL CARE, AND HEATING?: NOT HARD AT ALL

## 2022-08-08 NOTE — PATIENT INSTRUCTIONS
Well Visit, Men 48 to 72: Care Instructions  Overview     Well visits can help you stay healthy. Your doctor has checked your overall health and may have suggested ways to take good care of yourself. Your doctor also may have recommended tests. At home, you can help prevent illness withhealthy eating, regular exercise, and other steps. Follow-up care is a key part of your treatment and safety. Be sure to make and go to all appointments, and call your doctor if you are having problems. It's also a good idea to know your test results and keep alist of the medicines you take. How can you care for yourself at home? Get screening tests that you and your doctor decide on. Screening helps find diseases before any symptoms appear. Eat healthy foods. Choose fruits, vegetables, whole grains, protein, and low-fat dairy foods. Limit fat, especially saturated fat. Reduce salt in your diet. Limit alcohol. Have no more than 2 drinks a day or 14 drinks a week. Get at least 30 minutes of exercise on most days of the week. Walking is a good choice. You also may want to do other activities, such as running, swimming, cycling, or playing tennis or team sports. Reach and stay at a healthy weight. This will lower your risk for many problems, such as obesity, diabetes, heart disease, and high blood pressure. Do not smoke. Smoking can make health problems worse. If you need help quitting, talk to your doctor about stop-smoking programs and medicines. These can increase your chances of quitting for good. Care for your mental health. It is easy to get weighed down by worry and stress. Learn strategies to manage stress, like deep breathing and mindfulness, and stay connected with your family and community. If you find you often feel sad or hopeless, talk with your doctor. Treatment can help. Talk to your doctor about whether you have any risk factors for sexually transmitted infections (STIs).  You can help prevent STIs if you wait to have sex with a new partner (or partners) until you've each been tested for STIs. It also helps if you use condoms (male or female condoms) and if you limit your sex partners to one person who only has sex with you. Vaccines are available for some STIs. If it's important to you to prevent pregnancy with your partner, talk with your doctor about birth control options that might be best for you. If you think you may have a problem with alcohol or drug use, talk to your doctor. This includes prescription medicines (such as amphetamines and opioids) and illegal drugs (such as cocaine and methamphetamine). Your doctor can help you figure out what type of treatment is best for you. Protect your skin from too much sun. When you're outdoors from 10 a.m. to 4 p.m., stay in the shade or cover up with clothing and a hat with a wide brim. Wear sunglasses that block UV rays. Even when it's cloudy, put broad-spectrum sunscreen (SPF 30 or higher) on any exposed skin. See a dentist one or two times a year for checkups and to have your teeth cleaned. Wear a seat belt in the car. When should you call for help? Watch closely for changes in your health, and be sure to contact your doctor if you have any problems or symptoms that concern you. Where can you learn more? Go to https://[employer]. org and sign in to your [employer] account. Enter Y795 in the [facility] box to learn more about \"Well Visit, Men 48 to 72: Care Instructions. \"     If you do not have an account, please click on the \"Sign Up Now\" link. Current as of: October 6, 2021               Content Version: 13.3  © 3113-6052 Healthwise, Incorporated. Care instructions adapted under license by [facility] ([facility]). If you have questions about a medical condition or this instruction, always ask your healthcare professional. [address] any warranty or liability for your use of this information.

## 2022-08-08 NOTE — PROGRESS NOTES
Well Adult Note  Name: Musa Casey Date: 2022   MRN: [id number] Sex: Male   Age: 45 y.o. Ethnicity: Non- / Non    : 1970 Race: Black /       Arely Pa is here for well adult exam.  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 Is complaining of feeling of lump and discomfort over his sternal area he noticed about 3 days ago while he was working. States is aggravated with movement and he did not notice it there before. Review of Systems   Constitutional:  Negative for activity change, fatigue and fever. HENT:  Negative for congestion, ear pain, rhinorrhea, sore throat and trouble swallowing. Eyes:  Negative for pain, discharge and redness. Respiratory:  Negative for cough, shortness of breath and wheezing. Cardiovascular: Negative. Gastrointestinal:  Negative for abdominal pain, constipation, diarrhea, nausea and vomiting. Endocrine: Negative. Genitourinary:  Negative for dysuria, frequency and urgency. Musculoskeletal:  Negative for arthralgias, back pain and myalgias. Sternal discomfort   Skin:  Negative for rash. Allergic/Immunologic: Negative. Neurological:  Negative for dizziness, tremors, weakness and headaches. Hematological: Negative. Psychiatric/Behavioral:  Negative for dysphoric mood and sleep disturbance. The patient is not nervous/anxious. No Known Allergies      Prior to Visit Medications    Medication Sig Taking?  Authorizing Provider   rosuvastatin (CRESTOR) 5 MG tablet take 1 tablet by mouth once daily Yes ARIK Severino CNP   Cholecalciferol (VITAMIN D3) 50 MCG (2000) CAPS take 1 capsule by mouth once daily Yes ARIK Severino CNP   ibuprofen (IBU) 400 MG tablet Take 1 tablet by mouth every 6 hours as needed for Pain Yes ARIK Lewis CNP   omeprazole (PRILOSEC) 40 MG delayed release capsule Take 40 mg by mouth daily Yes Historical Provider, MD   CPAP Machine MISC by Does not apply route Please change CPAP pressure to auto 10-20 cm H20. Yes Ronal Elena PA-C   ZINC PO Take by mouth daily   Historical Provider, MD         Past Medical History:   Diagnosis Date    High cholesterol     Low testosterone 2020    dr Jeff Crisostomo     Sleep apnea        History reviewed. No pertinent surgical history. Family History   Problem Relation Age of Onset    High Blood Pressure Mother        Social History     Tobacco Use    Smoking status: Passive Smoke Exposure - Never Smoker    Smokeless tobacco: Never   Vaping Use    Vaping Use: Never used   Substance Use Topics    Alcohol use: No     Alcohol/week: 0.0 standard drinks    Drug use: No       Objective   /76   Pulse 75   Temp 97.4 °F (36.3 °C) (Oral)   Resp 16   Wt 284 lb (128.8 kg)   SpO2 94%   BMI 47.26 kg/m²   Wt Readings from Last 3 Encounters:   08/08/22 284 lb (128.8 kg)   06/07/22 288 lb (130.6 kg)   04/07/22 290 lb 9.6 oz (131.8 kg)     There were no vitals filed for this visit. Physical Exam  Vitals reviewed. Constitutional:       General: He is not in acute distress. Appearance: Normal appearance. He is well-developed. He is not toxic-appearing or diaphoretic. HENT:      Head: Normocephalic. No right periorbital erythema or left periorbital erythema. Jaw: No trismus. Right Ear: Hearing and external ear normal.      Left Ear: Hearing and external ear normal.      Nose: Nose normal. No mucosal edema or rhinorrhea. Mouth/Throat:      Mouth: Mucous membranes are moist.      Dentition: Normal dentition. Pharynx: Uvula midline. No posterior oropharyngeal erythema. Tonsils: No tonsillar exudate. 0 on the right. 0 on the left. Eyes:      General: Lids are normal.         Right eye: No discharge. Left eye: No discharge. Conjunctiva/sclera: Conjunctivae normal.      Right eye: Right conjunctiva is not injected. No chemosis.      Left eye: No chemosis. Pupils: Pupils are equal, round, and reactive to light. Neck:      Vascular: No JVD. Trachea: Trachea normal.   Cardiovascular:      Rate and Rhythm: Normal rate and regular rhythm. Heart sounds: Normal heart sounds. No murmur heard. Pulmonary:      Effort: Pulmonary effort is normal. No respiratory distress. Breath sounds: Normal breath sounds. No stridor. No wheezing. Abdominal:      General: Bowel sounds are normal. There is no distension. Palpations: Abdomen is soft. Tenderness: There is no abdominal tenderness. Musculoskeletal:         General: No tenderness or signs of injury. Normal range of motion. Cervical back: Full passive range of motion without pain and normal range of motion. Lymphadenopathy:      Cervical: No cervical adenopathy. Skin:     General: Skin is warm and dry. Capillary Refill: Capillary refill takes less than 2 seconds. Findings: No rash. Neurological:      Mental Status: He is alert and oriented to person, place, and time. GCS: GCS eye subscore is 4. GCS verbal subscore is 5. GCS motor subscore is 6. Cranial Nerves: No cranial nerve deficit. Coordination: Coordination normal.      Gait: Gait normal.   Psychiatric:         Mood and Affect: Mood is not anxious or depressed. Speech: Speech normal.         Behavior: Behavior normal. Behavior is not withdrawn or hyperactive. Behavior is cooperative. Thought Content: Thought content normal.         Judgment: Judgment normal.         Assessment   Plan   1. Encounter for well adult exam without abnormal findings  -     Basic Metabolic Panel; Future  -     Lipid, Fasting; Future  -     Hepatic Function Panel; Future  -     CBC with Auto Differential; Future  2. Screening for colorectal cancer  -     FIT-DNA (Cologuard)  3.  Elevated triglycerides with high cholesterol  -     rosuvastatin (CRESTOR) 5 MG tablet; take 1 tablet by mouth once daily, Disp-90 tablet, R-3Normal  4. Vitamin D deficiency  -     Cholecalciferol (VITAMIN D3) 50 MCG (2000 UT) CAPS; take 1 capsule by mouth once daily, Disp-90 capsule, R-3Normal  -     Vitamin D 25 Hydroxy; Future  5. Sternal pain  -     XR CHEST STANDARD (2 VW); Future       Patient encouraged to take NSAIDs to treat his sternal pain as I did not appreciate a mass on his sternum, only what feels like normal bony prominence. If he continues to have pain in his sternum for couple of weeks, he can complete a chest x-ray which is ordered. Personalized Preventive Plan   Current Health Maintenance Status  Immunization History   Administered Date(s) Administered    Tdap (Boostrix, Adacel) 09/28/2017        Health Maintenance   Topic Date Due    Colorectal Cancer Screen  Never done    Shingles vaccine (1 of 2) Never done    Lipids  01/14/2021    Depression Screen  06/18/2022    COVID-19 Vaccine (1) 08/08/2023 (Originally 5/15/1971)    Diabetes screen  08/08/2023 (Originally 11/15/2004)    Flu vaccine (1) 09/01/2022    DTaP/Tdap/Td vaccine (2 - Td or Tdap) 09/28/2027    HIV screen  Completed    Hepatitis A vaccine  Aged Out    Hepatitis B vaccine  Aged Out    Hib vaccine  Aged Out    Meningococcal (ACWY) vaccine  Aged Out    Pneumococcal 0-64 years Vaccine  Aged Out    Hepatitis C screen  Discontinued     Recommendations for Preventive Services Due: see orders and patient instructions/AVS.    Return in about 1 year (around 8/8/2023).

## 2022-08-08 NOTE — PROGRESS NOTES
and back pain. Skin: Negative. Allergic/Immunologic: Negative. Neurological: Negative. Negative for dizziness and light-headedness. Psychiatric/Behavioral: Negative. All other systems reviewed and are negative. Physical Exam:    BMI:  Body mass index is 47.69 kg/m². Wt Readings from Last 3 Encounters:   08/08/22 286 lb 9.6 oz (130 kg)   08/08/22 284 lb (128.8 kg)   06/07/22 288 lb (130.6 kg)     Weight stable / unchanged  Vitals: /82 (Site: Right Lower Arm, Position: Sitting, Cuff Size: Medium Adult)   Pulse 82   Temp 98.2 °F (36.8 °C) (Skin)   Ht 5' 5\" (1.651 m)   Wt 286 lb 9.6 oz (130 kg)   SpO2 98%   BMI 47.69 kg/m²       Physical Exam  Constitutional:       Appearance: Normal appearance. He is normal weight. HENT:      Head: Normocephalic and atraumatic. Right Ear: External ear normal.      Left Ear: External ear normal.      Nose: Nose normal.   Eyes:      Extraocular Movements: Extraocular movements intact. Conjunctiva/sclera: Conjunctivae normal.      Pupils: Pupils are equal, round, and reactive to light. Pulmonary:      Effort: Pulmonary effort is normal.   Musculoskeletal:      Cervical back: Normal range of motion and neck supple. Neurological:      General: No focal deficit present. Mental Status: He is alert and oriented to person, place, and time. Psychiatric:         Attention and Perception: Attention and perception normal.         Mood and Affect: Mood and affect normal.         Speech: Speech normal.         Behavior: Behavior normal. Behavior is cooperative. Thought Content: Thought content normal.         Cognition and Memory: Cognition normal.         Judgment: Judgment normal.         ASSESSMENT/DIAGNOSIS     Diagnosis Orders   1. Obstructive sleep apnea        2. Hypersomnia        3. Morbid obesity with BMI of 45.0-49.9, adult ([facility] [address]        4. Snoring               Plan   Do you need any equipment today?  No  - discussed ways to improve compliance   - Download reviewed and discussed with patient  - He  was advised to continue current positive airway pressure therapy with above described pressure. - He  advised to keep good compliance with current recommended pressure to get optimal results and clinical improvement  - Recommend 7-9 hours of sleep with PAP  - He was advised to call [facility] regarding supplies if needed.   -He call my office for earlier appointment if needed for worsening of sleep symptoms.   - He was instructed on weight loss  - Emelyn Bernie was educated about my impression and plan. Patient verbalizesunderstanding.   We will see Melissa Bidding back in: 6 months with download    Information added by my medical assistant/LPN was reviewed today         Cathryn Kim PA-C, MPAS  8/8/2022

## 2022-08-09 ENCOUNTER — HOSPITAL ENCOUNTER (EMERGENCY)
Age: 52
Discharge: HOME | End: 2022-08-09
Attending: NURSE PRACTITIONER
Payer: MEDICAID

## 2022-08-09 VITALS
DIASTOLIC BLOOD PRESSURE: 64 MMHG | WEIGHT: 288 LBS | SYSTOLIC BLOOD PRESSURE: 124 MMHG | RESPIRATION RATE: 16 BRPM | BODY MASS INDEX: 47.98 KG/M2 | HEART RATE: 76 BPM | TEMPERATURE: 98.2 F | HEIGHT: 65 IN | OXYGEN SATURATION: 96 %

## 2022-08-09 DIAGNOSIS — R11.2 NAUSEA VOMITING AND DIARRHEA: Primary | ICD-10-CM

## 2022-08-09 DIAGNOSIS — R19.7 NAUSEA VOMITING AND DIARRHEA: Primary | ICD-10-CM

## 2022-08-09 LAB
BILIRUBIN URINE: NEGATIVE
BLOOD, URINE: NEGATIVE
CHARACTER, URINE: CLEAR
COLOR: ABNORMAL
GLUCOSE URINE: NEGATIVE MG/DL
KETONES, URINE: NEGATIVE
LEUKOCYTE ESTERASE, URINE: NEGATIVE
NITRITE, URINE: NEGATIVE
PH UA: 5.5 (ref 5–9)
PROTEIN UA: NEGATIVE MG/DL
SPECIFIC GRAVITY UA: 1.02 (ref 1–1.03)
UROBILINOGEN, URINE: 0.2 EU/DL (ref 0.2–1)

## 2022-08-09 PROCEDURE — 81003 URINALYSIS AUTO W/O SCOPE: CPT

## 2022-08-09 PROCEDURE — 99213 OFFICE O/P EST LOW 20 MIN: CPT

## 2022-08-09 PROCEDURE — 99213 OFFICE O/P EST LOW 20 MIN: CPT | Performed by: NURSE PRACTITIONER

## 2022-08-09 RX ORDER — ONDANSETRON 4 MG/1
4 TABLET, ORALLY DISINTEGRATING ORAL EVERY 8 HOURS PRN
Qty: 10 TABLET | Refills: 0 | Status: SHIPPED | OUTPATIENT
Start: 2022-08-09

## 2022-08-09 ASSESSMENT — ENCOUNTER SYMPTOMS
DIARRHEA: 1
ABDOMINAL DISTENTION: 0
BLOOD IN STOOL: 0
RESPIRATORY NEGATIVE: 1
VOMITING: 1
RECTAL PAIN: 0
ABDOMINAL PAIN: 0
NAUSEA: 1
CONSTIPATION: 0

## 2022-08-09 ASSESSMENT — PAIN DESCRIPTION - ONSET: ONSET: GRADUAL

## 2022-08-09 ASSESSMENT — PAIN DESCRIPTION - DESCRIPTORS: DESCRIPTORS: ACHING;SORE

## 2022-08-09 ASSESSMENT — PAIN SCALES - GENERAL: PAINLEVEL_OUTOF10: 6

## 2022-08-09 ASSESSMENT — PAIN DESCRIPTION - FREQUENCY: FREQUENCY: CONTINUOUS

## 2022-08-09 ASSESSMENT — PAIN - FUNCTIONAL ASSESSMENT
PAIN_FUNCTIONAL_ASSESSMENT: ACTIVITIES ARE NOT PREVENTED
PAIN_FUNCTIONAL_ASSESSMENT: 0-10

## 2022-08-09 ASSESSMENT — PAIN DESCRIPTION - LOCATION: LOCATION: ABDOMEN

## 2022-08-09 ASSESSMENT — PAIN DESCRIPTION - PAIN TYPE: TYPE: ACUTE PAIN

## 2022-08-09 ASSESSMENT — PAIN DESCRIPTION - ORIENTATION: ORIENTATION: MID

## 2022-08-09 NOTE — Clinical Note
Giovanni Esvin was seen and treated in our emergency department on 8/9/2022. He may return to work on 08/11/2022. If you have any questions or concerns, please don't hesitate to call.       Codie Lacy, ARIK - CNP

## 2022-08-09 NOTE — ED TRIAGE NOTES
Pt to [facility] ambulatory with abdominal pain, vomiting x 2 today, and diarrhea. This started today around 0530.

## 2022-08-09 NOTE — ED PROVIDER NOTES
[address]       Chief Complaint   Patient presents with    Emesis     X 2 today    Diarrhea    Abdominal Pain       Nurses Notes reviewed and I agree except as noted in the HPI. HISTORY OF PRESENT ILLNESS   Fahad Balderas is a 45 y.o. male who presents complaints of vomiting and diarrhea starting today. Patient states he woke up this morning to go to work went to take Prilosec, states he felt nauseated and vomited x1. Notes that it was undigested food. He was able to go to work and perform his job duties he notes that he did have 1 episode of watery stool as well. Patient states last evening he ate some pizza and some fried chicken prior to going to bed. He denies fever, chills abdominal pain. He does note some dyspepsia and queasy feeling in the mid epigastric region. REVIEW OF SYSTEMS     Review of Systems   Constitutional: Negative. HENT: Negative. Respiratory: Negative. Cardiovascular: Negative. Gastrointestinal:  Positive for diarrhea, nausea and vomiting. Negative for abdominal distention, abdominal pain, blood in stool, constipation and rectal pain. Genitourinary: Negative. Musculoskeletal: Negative. Skin: Negative. PAST MEDICAL HISTORY         Diagnosis Date    High cholesterol     Low testosterone 2020    dr Elsy Brady     Sleep apnea        SURGICAL HISTORY     Patient  has no past surgical history on file. CURRENT MEDICATIONS       Previous Medications    CHOLECALCIFEROL (VITAMIN D3) 50 MCG (2000 UT) CAPS    take 1 capsule by mouth once daily    CPAP MACHINE MISC    by Does not apply route Please change CPAP pressure to auto 10-20 cm H20.     IBUPROFEN (IBU) 400 MG TABLET    Take 1 tablet by mouth every 6 hours as needed for Pain    OMEPRAZOLE (PRILOSEC) 40 MG DELAYED RELEASE CAPSULE    Take 40 mg by mouth daily    ROSUVASTATIN (CRESTOR) 5 MG TABLET    take 1 tablet by mouth once daily    ZINC PO Take by mouth daily        ALLERGIES     Patient is has No Known Allergies. FAMILY HISTORY     Patient'sfamily history includes High Blood Pressure in his mother. SOCIAL HISTORY     Patient  reports that he has never smoked. He has been exposed to tobacco smoke. He has never used smokeless tobacco. He reports that he does not drink alcohol and does not use drugs. PHYSICAL EXAM     ED TRIAGE VITALS  BP: 124/64, Temp: 98.2 °F (36.8 °C), Heart Rate: 76, Resp: 16, SpO2: 96 %  Physical Exam  Constitutional:       Appearance: Normal appearance. He is normal weight. HENT:      Head: Normocephalic and atraumatic. Right Ear: Tympanic membrane, ear canal and external ear normal.      Left Ear: Tympanic membrane, ear canal and external ear normal.      Nose: Nose normal. No congestion. Mouth/Throat:      Mouth: Mucous membranes are moist.   Eyes:      Extraocular Movements: Extraocular movements intact. Pupils: Pupils are equal, round, and reactive to light. Cardiovascular:      Rate and Rhythm: Normal rate and regular rhythm. Pulses: Normal pulses. Heart sounds: Normal heart sounds. Pulmonary:      Effort: Pulmonary effort is normal.      Breath sounds: Normal breath sounds. Abdominal:      General: Bowel sounds are normal. There is no distension. Palpations: Abdomen is soft. There is no mass. Tenderness: There is no abdominal tenderness. There is no guarding or rebound. Hernia: No hernia is present. Musculoskeletal:      Cervical back: Normal range of motion. Lymphadenopathy:      Cervical: No cervical adenopathy. Skin:     General: Skin is warm and dry. Capillary Refill: Capillary refill takes less than 2 seconds. Neurological:      General: No focal deficit present. Mental Status: He is alert and oriented to person, place, and time. DIAGNOSTIC RESULTS   Labs: No results found for this visit on 08/09/22.     IMAGING:  No orders to display URGENT CARE COURSE:     Vitals:    08/09/22 1419   BP: 124/64   Pulse: 76   Resp: 16   Temp: 98.2 °F (36.8 °C)   TempSrc: Temporal   SpO2: 96%   Weight: 288 lb (130.6 kg)   Height: 5' 5\" (1.651 m)       Medications - No data to display  PROCEDURES:  None  FINALIMPRESSION      1. Nausea vomiting and diarrhea        DISPOSITION/PLAN   DISPOSITION    Presents with very vague symptoms. He is well-hydrated and in no acute distress. Patient is afebrile, he is able to keep down food and fluid. He was able to go to work and complete his daily activities. Discussed with him that this may be some dyspepsia given the food that he ate prior to going to bed last evening. Recommend him to continue taking his Prilosec. I will send him over some Zofran to use for the nausea. He can follow-up with his family care provider or go to the emergency room if he has any new or worse symptoms. PATIENT REFERRED TO:  ARIK Dye CNP  [address] [place] [address] [address] [address] [phone]      If symptoms worsen  DISCHARGE MEDICATIONS:  New Prescriptions    ONDANSETRON (ZOFRAN ODT) 4 MG DISINTEGRATING TABLET    Take 1 tablet by mouth every 8 hours as needed for Nausea or Vomiting     Current Discharge Medication List          Janel Tripp, [address] [place] [address], APRN - CNP  08/09/22 4509

## 2022-08-26 LAB — NONINV COLON CA DNA+OCC BLD SCRN STL QL: NEGATIVE

## 2022-09-03 ENCOUNTER — HOSPITAL ENCOUNTER (EMERGENCY)
Age: 52
Discharge: HOME | End: 2022-09-03
Payer: MEDICAID

## 2022-09-03 VITALS
BODY MASS INDEX: 47.8 KG/M2 | WEIGHT: 280 LBS | HEART RATE: 82 BPM | TEMPERATURE: 98 F | SYSTOLIC BLOOD PRESSURE: 130 MMHG | DIASTOLIC BLOOD PRESSURE: 83 MMHG | RESPIRATION RATE: 18 BRPM | HEIGHT: 64 IN | OXYGEN SATURATION: 96 %

## 2022-09-03 DIAGNOSIS — S76.312A STRAIN OF LEFT HAMSTRING, INITIAL ENCOUNTER: Primary | ICD-10-CM

## 2022-09-03 PROCEDURE — 99213 OFFICE O/P EST LOW 20 MIN: CPT | Performed by: NURSE PRACTITIONER

## 2022-09-03 PROCEDURE — 99213 OFFICE O/P EST LOW 20 MIN: CPT

## 2022-09-03 RX ORDER — METHYLPREDNISOLONE 4 MG/1
TABLET ORAL
Qty: 1 KIT | Refills: 0 | Status: SHIPPED | OUTPATIENT
Start: 2022-09-03

## 2022-09-03 RX ORDER — CYCLOBENZAPRINE HCL 10 MG
10 TABLET ORAL 3 TIMES DAILY PRN
Qty: 30 TABLET | Refills: 0 | Status: SHIPPED | OUTPATIENT
Start: 2022-09-03 | End: 2022-09-13

## 2022-09-03 RX ORDER — NAPROXEN 500 MG/1
500 TABLET ORAL 2 TIMES DAILY
Qty: 60 TABLET | Refills: 0 | Status: SHIPPED | OUTPATIENT
Start: 2022-09-03

## 2022-09-03 RX ORDER — MULTIVITAMIN WITH IRON
250 TABLET ORAL 2 TIMES DAILY
Qty: 20 TABLET | Refills: 0 | Status: SHIPPED | OUTPATIENT
Start: 2022-09-03

## 2022-09-03 ASSESSMENT — PAIN DESCRIPTION - LOCATION: LOCATION: LEG

## 2022-09-03 ASSESSMENT — ENCOUNTER SYMPTOMS
EYE REDNESS: 0
EYE ITCHING: 0
SINUS PRESSURE: 0
GASTROINTESTINAL NEGATIVE: 1
COUGH: 0
SINUS PAIN: 0
RHINORRHEA: 0
EYE PAIN: 0

## 2022-09-03 ASSESSMENT — PAIN DESCRIPTION - DESCRIPTORS: DESCRIPTORS: SORE

## 2022-09-03 ASSESSMENT — PAIN - FUNCTIONAL ASSESSMENT
PAIN_FUNCTIONAL_ASSESSMENT: PREVENTS OR INTERFERES SOME ACTIVE ACTIVITIES AND ADLS
PAIN_FUNCTIONAL_ASSESSMENT: 0-10

## 2022-09-03 ASSESSMENT — PAIN SCALES - GENERAL: PAINLEVEL_OUTOF10: 7

## 2022-09-03 ASSESSMENT — PAIN DESCRIPTION - FREQUENCY: FREQUENCY: CONTINUOUS

## 2022-09-03 ASSESSMENT — PAIN DESCRIPTION - ONSET: ONSET: GRADUAL

## 2022-09-03 ASSESSMENT — PAIN DESCRIPTION - PAIN TYPE: TYPE: ACUTE PAIN

## 2022-09-03 ASSESSMENT — PAIN DESCRIPTION - ORIENTATION: ORIENTATION: LEFT;UPPER

## 2022-09-03 NOTE — ED PROVIDER NOTES
[address]       Chief Complaint   Patient presents with    Leg Pain     Left upper       Nurses Notes reviewed and I agree except as noted in the HPI. HISTORY OF PRESENT ILLNESS   Dev Liao is a 45 y.o. male who presents with left upper posterior thigh pain that started just pta. 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 [place] a sharp pull in the back of his upper left leg. He also reports he twisted his right ankle on [employer] this week causing him to fall. He has this wrapped with an ace wrap. REVIEW OF SYSTEMS     Review of Systems   Constitutional:  Positive for activity change. Negative for appetite change, fatigue and fever. HENT:  Negative for congestion, postnasal drip, rhinorrhea, sinus pressure and sinus pain. Eyes:  Negative for pain, redness and itching. Respiratory:  Negative for cough. Cardiovascular:  Positive for leg swelling. Negative for chest pain. Gastrointestinal: Negative. Endocrine: Negative for cold intolerance and heat intolerance. Genitourinary: Negative. Musculoskeletal:  Positive for arthralgias, gait problem, joint swelling and myalgias. Allergic/Immunologic: Positive for environmental allergies. Neurological:  Negative for light-headedness and headaches. Hematological:  Negative for adenopathy. Does not bruise/bleed easily. Psychiatric/Behavioral: Negative. PAST MEDICAL HISTORY         Diagnosis Date    High cholesterol     Low testosterone 2020    dr Renzo Jeffers     Sleep apnea        SURGICAL HISTORY     Patient  has no past surgical history on file.     CURRENT MEDICATIONS       Discharge Medication List as of 9/3/2022  1:32 PM        CONTINUE these medications which have NOT CHANGED    Details   ondansetron (ZOFRAN ODT) 4 MG disintegrating tablet Take 1 tablet by mouth every 8 hours as needed for Nausea or Vomiting, Disp-10 tablet, R-0Normal      rosuvastatin (CRESTOR) 5 MG tablet take 1 tablet by mouth once daily, Disp-90 tablet, R-3Normal      Cholecalciferol (VITAMIN D3) 50 MCG (2000 UT) CAPS take 1 capsule by mouth once daily, Disp-90 capsule, R-3Normal      ibuprofen (IBU) 400 MG tablet Take 1 tablet by mouth every 6 hours as needed for Pain, Disp-30 tablet, R-0Normal      omeprazole (PRILOSEC) 40 MG delayed release capsule Take 40 mg by mouth dailyHistorical Med      ZINC PO Take by mouth daily Historical Med      CPAP Machine MISC Starting Wed 2/12/2020, Disp-1 each, R-0, PrintPlease change CPAP pressure to auto 10-20 cm H20. ALLERGIES     Patient is has No Known Allergies. FAMILY HISTORY     Patient'sfamily history includes High Blood Pressure in his mother. SOCIAL HISTORY     Patient  reports that he has never smoked. He has been exposed to tobacco smoke. He has never used smokeless tobacco. He reports that he does not drink alcohol and does not use drugs. PHYSICAL EXAM     ED TRIAGE VITALS  BP: 130/83, Temp: 98 °F (36.7 °C), Heart Rate: 82, Resp: 18, SpO2: 96 %  Physical Exam  Vitals and nursing note reviewed. Constitutional:       Appearance: He is obese. He is not ill-appearing. HENT:      Head: Normocephalic. Right Ear: Ear canal and external ear normal.      Left Ear: Ear canal and external ear normal.      Mouth/Throat:      Mouth: Mucous membranes are moist.   Eyes:      Conjunctiva/sclera: Conjunctivae normal.   Cardiovascular:      Rate and Rhythm: Normal rate and regular rhythm. Pulses: Normal pulses. Heart sounds: Normal heart sounds. Pulmonary:      Effort: Pulmonary effort is normal.   Abdominal:      General: Abdomen is flat. Palpations: Abdomen is soft. Musculoskeletal:         General: Swelling and tenderness present. No deformity or signs of injury. Normal range of motion. Cervical back: Normal range of motion and neck supple.       Left upper leg: Swelling and tenderness present. No deformity or lacerations. Right ankle: Swelling present. Tenderness present over the lateral malleolus. Normal range of motion. Anterior drawer test negative. Right Achilles Tendon: Normal.      Left ankle:      Left Achilles Tendon: Normal.        Legs:    Skin:     General: Skin is warm and dry. Capillary Refill: Capillary refill takes less than 2 seconds. Neurological:      General: No focal deficit present. Mental Status: He is alert and oriented to person, place, and time. Mental status is at baseline. Psychiatric:         Mood and Affect: Mood normal.         Behavior: Behavior normal.         Thought Content: Thought content normal.       DIAGNOSTIC RESULTS   Labs: No results found for this visit on 09/03/22. IMAGING:  No orders to display     URGENT CARE COURSE:     Vitals:    09/03/22 1314   BP: 130/83   Pulse: 82   Resp: 18   Temp: 98 °F (36.7 °C)   TempSrc: Temporal   SpO2: 96%   Weight: 280 lb (127 kg)   Height: 5' 4\" (1.626 m)       Medications - No data to display  PROCEDURES:  None  FINALIMPRESSION    I have reviewed the patient's medical history in detail and updated the computerized patient record. HPI/ROS per the patient and caregiver. Overall non toxic in appearance. Answers questions appropriately. 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 He has been given instructions on r/b/e/se of medications prescribed, and RICE. He has been provided a note for work due to he does a lot of bending and walking for his job. Pt agreeable to plan of care and provided instructions. 1. Strain of left hamstring, initial encounter        DISPOSITION/PLAN   DISPOSITION Decision To Discharge 09/03/2022 01:28:31 PM    PATIENT REFERRED TO:  ARIK Truong - CNP  [address] [place] [address] [address] [address] [phone]    In 3 days  As needed, If symptoms worsen  DISCHARGE MEDICATIONS:  Discharge Medication List as of 9/3/2022  1:32 PM        START taking these medications    Details   cyclobenzaprine (FLEXERIL) 10 MG tablet Take 1 tablet by mouth 3 times daily as needed for Muscle spasms, Disp-30 tablet, R-0Normal      naproxen (NAPROSYN) 500 MG tablet Take 1 tablet by mouth 2 times daily, Disp-60 tablet, R-0Normal      magnesium (MAGNESIUM-OXIDE) 250 MG TABS tablet Take 1 tablet by mouth in the morning and at bedtime, Disp-20 tablet, R-0Normal      methylPREDNISolone (MEDROL, AISHWARYA,) 4 MG tablet Take by mouth., Disp-1 kit, R-0Normal           Discharge Medication List as of 9/3/2022  1:32 PM          ARIK Clarke - ARIK Hahn CNP  09/03/22 1407

## 2022-09-03 NOTE — ED TRIAGE NOTES
Pt to [facility] ambulatory with left, upper leg pain. 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

## 2023-01-22 ENCOUNTER — HOSPITAL ENCOUNTER (EMERGENCY)
Age: 53
Discharge: HOME | End: 2023-01-22
Payer: MEDICAID

## 2023-01-22 VITALS
RESPIRATION RATE: 16 BRPM | BODY MASS INDEX: 47.2 KG/M2 | WEIGHT: 275 LBS | SYSTOLIC BLOOD PRESSURE: 126 MMHG | HEART RATE: 92 BPM | OXYGEN SATURATION: 96 % | TEMPERATURE: 98.2 F | DIASTOLIC BLOOD PRESSURE: 66 MMHG

## 2023-01-22 DIAGNOSIS — J06.9 VIRAL URI WITH COUGH: Primary | ICD-10-CM

## 2023-01-22 LAB
FLUAV AG SPEC QL: NEGATIVE
FLUBV AG SPEC QL: NEGATIVE

## 2023-01-22 PROCEDURE — 99213 OFFICE O/P EST LOW 20 MIN: CPT

## 2023-01-22 PROCEDURE — 87804 INFLUENZA ASSAY W/OPTIC: CPT

## 2023-01-22 RX ORDER — PREDNISONE 20 MG/1
20 TABLET ORAL 2 TIMES DAILY
Qty: 10 TABLET | Refills: 0 | Status: SHIPPED | OUTPATIENT
Start: 2023-01-22 | End: 2023-01-27

## 2023-01-22 RX ORDER — GUAIFENESIN, PSEUDOEPHEDRINE HYDROCHLORIDE 600; 60 MG/1; MG/1
1 TABLET, EXTENDED RELEASE ORAL EVERY 12 HOURS PRN
Qty: 28 TABLET | Refills: 0 | Status: SHIPPED | OUTPATIENT
Start: 2023-01-22 | End: 2023-02-05

## 2023-01-22 RX ORDER — CETIRIZINE HYDROCHLORIDE 10 MG/1
10 TABLET ORAL DAILY
Qty: 30 TABLET | Refills: 0 | Status: SHIPPED | OUTPATIENT
Start: 2023-01-22 | End: 2023-02-21

## 2023-01-22 RX ORDER — FLUTICASONE PROPIONATE 50 MCG
1 SPRAY, SUSPENSION (ML) NASAL DAILY
Qty: 16 G | Refills: 0 | Status: SHIPPED | OUTPATIENT
Start: 2023-01-22

## 2023-01-22 ASSESSMENT — ENCOUNTER SYMPTOMS
DIARRHEA: 0
SHORTNESS OF BREATH: 0
NAUSEA: 0
CHEST TIGHTNESS: 0
RHINORRHEA: 1
COUGH: 1
SORE THROAT: 1
SINUS PRESSURE: 1
VOMITING: 0

## 2023-01-22 ASSESSMENT — PAIN DESCRIPTION - LOCATION: LOCATION: HEAD

## 2023-01-22 ASSESSMENT — PAIN - FUNCTIONAL ASSESSMENT: PAIN_FUNCTIONAL_ASSESSMENT: 0-10

## 2023-01-22 ASSESSMENT — PAIN SCALES - GENERAL: PAINLEVEL_OUTOF10: 6

## 2023-01-22 NOTE — ED TRIAGE NOTES
Sally Held arrives to room with complaint of  eyes watering, sinus drainage and congestion, diarrhea, headache  symptoms started 1 days ago.

## 2023-01-22 NOTE — Clinical Note
Gisselle Ybarra was seen and treated in our emergency department on 1/22/2023. He may return to work on 01/24/2023. May be off school or work one to two days if needed. Should also be fever free 24 hours before returning to work or school. If you have any questions or concerns, please don't hesitate to call.       Meiraustyn Green, APRN - CNP

## 2023-01-22 NOTE — ED PROVIDER NOTES
[facility]  Urgent Care Encounter       CHIEF COMPLAINT       Chief Complaint   Patient presents with    Nasal Congestion       Nurses Notes reviewed and I agree except as noted in the HPI. HISTORY OF PRESENT ILLNESS   Marisela Hussein is a 47 y.o. male who presents to the [facility] urgent care for evaluation of congestion. He reports his symptoms started yesterday. He reports symptoms as congestion, rhinorrhea, postnasal drainage, mild pharyngitis, cough, myalgias, and diarrhea. He denies fever or chills. Denies known exposure to someone ill. The history is provided by the patient. No  was used. REVIEW OF SYSTEMS     Review of Systems   Constitutional:  Positive for fatigue. Negative for activity change, appetite change, chills and fever. HENT:  Positive for congestion, postnasal drip, rhinorrhea, sinus pressure and sore throat. Negative for ear discharge and ear pain. Respiratory:  Positive for cough. Negative for chest tightness and shortness of breath. Cardiovascular:  Negative for chest pain. Gastrointestinal:  Negative for diarrhea, nausea and vomiting. Genitourinary:  Negative for dysuria. Musculoskeletal:  Positive for myalgias. Skin:  Negative for rash. Allergic/Immunologic: Negative for environmental allergies and food allergies. Neurological:  Negative for dizziness and headaches. PAST MEDICAL HISTORY         Diagnosis Date    High cholesterol     Low testosterone 2020    dr Marisela Kwong     Sleep apnea        SURGICALHISTORY     Patient  has no past surgical history on file. CURRENT MEDICATIONS       Previous Medications    CHOLECALCIFEROL (VITAMIN D3) 50 MCG (2000 UT) CAPS    take 1 capsule by mouth once daily    CPAP MACHINE MISC    by Does not apply route Please change CPAP pressure to auto 10-20 cm H20.     IBUPROFEN (IBU) 400 MG TABLET    Take 1 tablet by mouth every 6 hours as needed for Pain    MAGNESIUM (MAGNESIUM-OXIDE) 250 MG TABS TABLET    Take 1 tablet by mouth in the morning and at bedtime    NAPROXEN (NAPROSYN) 500 MG TABLET    Take 1 tablet by mouth 2 times daily    OMEPRAZOLE (PRILOSEC) 40 MG DELAYED RELEASE CAPSULE    Take 40 mg by mouth daily    ONDANSETRON (ZOFRAN ODT) 4 MG DISINTEGRATING TABLET    Take 1 tablet by mouth every 8 hours as needed for Nausea or Vomiting    ROSUVASTATIN (CRESTOR) 5 MG TABLET    take 1 tablet by mouth once daily    ZINC PO    Take by mouth daily        ALLERGIES     Patient is has No Known Allergies. Patients   Immunization History   Administered Date(s) Administered    Tdap (Boostrix, Adacel) 09/28/2017       FAMILY HISTORY     Patient's family history includes High Blood Pressure in his mother. SOCIAL HISTORY     Patient  reports that he has never smoked. He has been exposed to tobacco smoke. He has never used smokeless tobacco. He reports that he does not drink alcohol and does not use drugs. PHYSICAL EXAM     ED TRIAGE VITALS  BP: 126/66, Temp: 98.2 °F (36.8 °C), Heart Rate: 92, Resp: 16, SpO2: 96 %,Estimated body mass index is 47.2 kg/m² as calculated from the following:    Height as of 9/3/22: 5' 4\" (1.626 m). Weight as of this encounter: 275 lb (124.7 kg). ,No LMP for male patient. Physical Exam  Vitals and nursing note reviewed. Constitutional:       General: He is not in acute distress. Appearance: Normal appearance. He is not ill-appearing, toxic-appearing or diaphoretic. HENT:      Head: Normocephalic. Right Ear: Ear canal and external ear normal.      Left Ear: Ear canal and external ear normal.      Nose: Nose normal. No congestion or rhinorrhea. Mouth/Throat:      Mouth: Mucous membranes are moist.      Pharynx: Oropharynx is clear. No oropharyngeal exudate or posterior oropharyngeal erythema. Cardiovascular:      Rate and Rhythm: Normal rate. Pulses: Normal pulses.    Pulmonary:      Effort: Pulmonary effort is normal. No respiratory distress. Breath sounds: No stridor. No wheezing or rhonchi. Abdominal:      General: Abdomen is flat. Bowel sounds are normal.      Palpations: Abdomen is soft. Musculoskeletal:         General: No swelling or tenderness. Normal range of motion. Cervical back: Normal range of motion. Neurological:      General: No focal deficit present. Mental Status: He is alert and oriented to person, place, and time. Psychiatric:         Mood and Affect: Mood normal.         Behavior: Behavior normal.       DIAGNOSTIC RESULTS     Labs:  Results for orders placed or performed during the hospital encounter of 01/22/23   Rapid influenza A/B antigens   Result Value Ref Range    Flu A Antigen Negative NEGATIVE    Flu B Antigen Negative NEGATIVE       IMAGING:    No orders to display         EKG: None      URGENT CARE COURSE:     Vitals:    01/22/23 1723   BP: 126/66   Pulse: 92   Resp: 16   Temp: 98.2 °F (36.8 °C)   TempSrc: Temporal   SpO2: 96%   Weight: 275 lb (124.7 kg)       Medications - No data to display         PROCEDURES:  None    FINAL IMPRESSION      1. Viral URI with cough          DISPOSITION/ PLAN     Patient seen and evaluated for the above symptoms. A rapid influenza was obtained and negative. Assessment consistent with likely acute viral URI with cough. Patient is provided a prescription for prednisone, Zyrtec, Flonase, and Mucinex D. The Patient is instructed to use over-the-counter Tylenol and Motrin for pain or fever. Instructed to follow-up with their PCP or [facility] family medicine clinic in 3 to 5 days and worsening symptoms. The patient is agreeable with the above plan and denies questions or concerns at this time. PATIENT REFERRED TO:  Gil Shukla, APRN - CNP  [address] / [place] [address]      DISCHARGE MEDICATIONS:  New Prescriptions    CETIRIZINE (ZYRTEC) 10 MG TABLET    Take 1 tablet by mouth daily    FLUTICASONE (FLONASE) 50 MCG/ACT NASAL SPRAY 1 spray by Each Nostril route daily    PREDNISONE (DELTASONE) 20 MG TABLET    Take 1 tablet by mouth 2 times daily for 5 days    PSEUDOEPHEDRINE-GUAIFENESIN (MUCINEX D)  MG PER EXTENDED RELEASE TABLET    Take 1 tablet by mouth every 12 hours as needed for Congestion       Discontinued Medications    METHYLPREDNISOLONE (MEDROL, AISHWARYA,) 4 MG TABLET    Take by mouth.        Current Discharge Medication List          ARIK Villa CNP    (Please note that portions of this note were completed with a voice recognition program. Efforts were made to edit the dictations but occasionally words are mis-transcribed.)           ARIK Villa CNP  01/22/23 9110

## 2023-02-28 ENCOUNTER — HOSPITAL ENCOUNTER (EMERGENCY)
Age: 53
Discharge: HOME | End: 2023-02-28
Payer: MEDICAID

## 2023-02-28 VITALS
RESPIRATION RATE: 14 BRPM | SYSTOLIC BLOOD PRESSURE: 132 MMHG | TEMPERATURE: 97.6 F | WEIGHT: 270 LBS | BODY MASS INDEX: 46.35 KG/M2 | OXYGEN SATURATION: 97 % | DIASTOLIC BLOOD PRESSURE: 71 MMHG | HEART RATE: 63 BPM

## 2023-02-28 DIAGNOSIS — M25.521 RIGHT ELBOW PAIN: Primary | ICD-10-CM

## 2023-02-28 DIAGNOSIS — K20.90 ESOPHAGITIS WITH GASTRITIS: ICD-10-CM

## 2023-02-28 DIAGNOSIS — K29.70 ESOPHAGITIS WITH GASTRITIS: ICD-10-CM

## 2023-02-28 PROCEDURE — 99212 OFFICE O/P EST SF 10 MIN: CPT | Performed by: NURSE PRACTITIONER

## 2023-02-28 PROCEDURE — 99213 OFFICE O/P EST LOW 20 MIN: CPT

## 2023-02-28 ASSESSMENT — ENCOUNTER SYMPTOMS
SINUS PRESSURE: 0
ABDOMINAL PAIN: 0
CONSTIPATION: 0
SHORTNESS OF BREATH: 0
BLOOD IN STOOL: 0
COUGH: 0
DIARRHEA: 0
VOMITING: 0
WHEEZING: 0
EYE PAIN: 0
NAUSEA: 0
RHINORRHEA: 0

## 2023-02-28 ASSESSMENT — PAIN DESCRIPTION - ORIENTATION: ORIENTATION: RIGHT

## 2023-02-28 ASSESSMENT — PAIN SCALES - GENERAL: PAINLEVEL_OUTOF10: 5

## 2023-02-28 ASSESSMENT — PAIN DESCRIPTION - LOCATION: LOCATION: ELBOW

## 2023-02-28 ASSESSMENT — PAIN - FUNCTIONAL ASSESSMENT: PAIN_FUNCTIONAL_ASSESSMENT: 0-10

## 2023-02-28 NOTE — Clinical Note
Ailyn Marcia was seen and treated in our emergency department on 2/28/2023. He may return to work on 03/02/2023. If you have any questions or concerns, please don't hesitate to call.       Rony Barahona, APRN - CNP
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
## 2023-02-28 NOTE — DISCHARGE INSTRUCTIONS
Go to ER for worsening symptoms, chest pain, shortness of breath, inability keep liquids down, inability urinate for greater than 8 hours or numbness and tingling in the extremities. May take Tylenol or ibuprofen as needed for pain. Follow-up with your primary care provider. Apply ice as needed for comfort for up to 15 minutes at a time. Continue to take your medication for reflux every day.

## 2023-02-28 NOTE — ED PROVIDER NOTES
[address]       Chief Complaint   Patient presents with    Muscle Pain        Nurses Notes reviewed and I agree except as noted in the HPI. HISTORY OF PRESENT ILLNESS   Enrique Bill is a 47 y.o. male who presents to urgent care with complaint of right elbow pain that is been ongoing for over a year. Patient also states that last night he had some chest pain while at work. He was seen by the nurse who did an EKG and some other testing and no significant findings were found. Patient then went back to work and worked another 6 hours. Patient states he came in to have it further checked out. Patient initially told nurse that that chest was a burning sensation. He also told her that he has reflux and has not been taking his medication. When provider went in to speak to patient he stated that he has been taking his reflux medication and the chest pain is a squeezing sensation. Patient denies taking any medications for his symptoms. He denies associated shortness of breath, dizziness, diaphoresis, numbness or tingling in extremities or palpitations. REVIEW OF SYSTEMS     Review of Systems   Constitutional:  Negative for appetite change, chills, fatigue, fever and unexpected weight change. HENT:  Negative for ear pain, rhinorrhea and sinus pressure. Eyes:  Negative for pain and visual disturbance. Respiratory:  Negative for cough, shortness of breath and wheezing. Cardiovascular:  Negative for chest pain, palpitations and leg swelling. Gastrointestinal:  Negative for abdominal pain, blood in stool, constipation, diarrhea, nausea and vomiting. Genitourinary:  Negative for dysuria, frequency and hematuria. Musculoskeletal:  Positive for arthralgias (right elbow pain). Negative for joint swelling. Skin:  Negative for rash. Neurological:  Negative for dizziness, syncope, weakness, light-headedness and headaches. Hematological:  Does not bruise/bleed easily. PAST MEDICAL HISTORY         Diagnosis Date    High cholesterol     Low testosterone 2020    dr Stevenson Espinoza     Sleep apnea        SURGICAL HISTORY     Patient  has no past surgical history on file. CURRENT MEDICATIONS       Discharge Medication List as of 2/28/2023  9:11 AM        CONTINUE these medications which have NOT CHANGED    Details   fluticasone (FLONASE) 50 MCG/ACT nasal spray 1 spray by Each Nostril route daily, Disp-16 g, R-0Normal      naproxen (NAPROSYN) 500 MG tablet Take 1 tablet by mouth 2 times daily, Disp-60 tablet, R-0Normal      rosuvastatin (CRESTOR) 5 MG tablet take 1 tablet by mouth once daily, Disp-90 tablet, R-3Normal      Cholecalciferol (VITAMIN D3) 50 MCG (2000 UT) CAPS take 1 capsule by mouth once daily, Disp-90 capsule, R-3Normal      ibuprofen (IBU) 400 MG tablet Take 1 tablet by mouth every 6 hours as needed for Pain, Disp-30 tablet, R-0Normal      omeprazole (PRILOSEC) 40 MG delayed release capsule Take 40 mg by mouth dailyHistorical Med      ZINC PO Take by mouth daily Historical Med      CPAP Machine MISC Starting Wed 2/12/2020, Disp-1 each, R-0, PrintPlease change CPAP pressure to auto 10-20 cm H20. ALLERGIES     Patient is has No Known Allergies. FAMILY HISTORY     Patient'sfamily history includes High Blood Pressure in his mother. SOCIAL HISTORY     Patient  reports that he has never smoked. He has been exposed to tobacco smoke. He has never used smokeless tobacco. He reports that he does not drink alcohol and does not use drugs. PHYSICAL EXAM     ED TRIAGE VITALS  BP: 132/71, Temp: 97.6 °F (36.4 °C), Heart Rate: 63, Resp: 14, SpO2: 97 %  Physical Exam  Vitals and nursing note reviewed. Constitutional:       General: He is not in acute distress. Appearance: He is well-developed. He is not ill-appearing, toxic-appearing or diaphoretic. HENT:      Head: Normocephalic and atraumatic.    Eyes: Conjunctiva/sclera: Conjunctivae normal.   Cardiovascular:      Rate and Rhythm: Normal rate and regular rhythm. Heart sounds: Normal heart sounds. No murmur heard. No gallop. Pulmonary:      Effort: Pulmonary effort is normal. No respiratory distress. Breath sounds: Normal breath sounds. No decreased breath sounds, wheezing, rhonchi or rales. Abdominal:      Palpations: Abdomen is soft. Musculoskeletal:         General: Normal range of motion. Cervical back: Normal range of motion and neck supple. Skin:     General: Skin is warm and dry. Neurological:      Mental Status: He is alert and oriented to person, place, and time. DIAGNOSTIC RESULTS   Labs:No results found for this visit on 02/28/23. IMAGING:  No orders to display     URGENT CARE COURSE:        MDM  Number of Diagnoses or Management Options      Patient presents to urgent care with complaint of right elbow pain that is been ongoing for over a year. Patient was encouraged to go to the ER if he continues to have what he is calling chest pain. His pain is not reproducible on palpation. He did have an EKG done at work. He did bring the note from his visit to the work provider. He did go back to work and worked another 6 hours. He denies taking any medications for his symptoms. Patient is given a referral to orth[facility] for his elbow. Patient is also encouraged to follow-up with his primary care provider with his concerns. Patient instructed to go to ER for worsening symptoms, chest pain, shortness of breath, inability keep liquids down, inability urinate for greater than 8 hours or numbness and tingling in the extremities. May take Tylenol or ibuprofen as needed for pain. Follow-up with your primary care provider. Apply ice as needed for comfort for up to 15 minutes at a time. Medications - No data to display  PROCEDURES:    Procedures    FINALIMPRESSION      1. Right elbow pain    2.  Esophagitis with gastritis        DISPOSITION/PLAN   DISPOSITION Decision To Discharge 02/28/2023 09:11:18 AM    PATIENT REFERRED TO:  [address] [address] [phone]    if symptoms do not improve    ARIK Buck CNP  [id number] [address] [place] [address] [id number] [phone]    Schedule an appointment as soon as possible for a visit     DISCHARGE MEDICATIONS:  Discharge Medication List as of 2/28/2023  9:11 AM        Discharge Medication List as of 2/28/2023  9:11 AM          ARIK Singleton CNP, APRN - CNP  02/28/23 0875

## 2023-03-16 ENCOUNTER — HOSPITAL ENCOUNTER (EMERGENCY)
Age: 53
Discharge: HOME | End: 2023-03-16
Payer: MEDICAID

## 2023-03-16 VITALS
DIASTOLIC BLOOD PRESSURE: 81 MMHG | HEART RATE: 74 BPM | OXYGEN SATURATION: 95 % | SYSTOLIC BLOOD PRESSURE: 149 MMHG | WEIGHT: 270 LBS | TEMPERATURE: 97 F | BODY MASS INDEX: 44.98 KG/M2 | HEIGHT: 65 IN

## 2023-03-16 DIAGNOSIS — G43.009 MIGRAINE WITHOUT AURA AND WITHOUT STATUS MIGRAINOSUS, NOT INTRACTABLE: Primary | ICD-10-CM

## 2023-03-16 PROCEDURE — 6370000000 HC RX 637 (ALT 250 FOR IP): Performed by: NURSE PRACTITIONER

## 2023-03-16 PROCEDURE — 6370000000 HC RX 637 (ALT 250 FOR IP)

## 2023-03-16 PROCEDURE — 99213 OFFICE O/P EST LOW 20 MIN: CPT

## 2023-03-16 PROCEDURE — 87636 SARSCOV2 & INF A&B AMP PRB: CPT

## 2023-03-16 RX ORDER — IBUPROFEN 800 MG/1
800 TABLET ORAL ONCE
Status: COMPLETED | OUTPATIENT
Start: 2023-03-16 | End: 2023-03-16

## 2023-03-16 RX ORDER — OMEPRAZOLE 10 MG/1
10 CAPSULE, DELAYED RELEASE ORAL DAILY
COMMUNITY

## 2023-03-16 RX ORDER — ONDANSETRON 4 MG/1
4 TABLET, ORALLY DISINTEGRATING ORAL ONCE
Status: COMPLETED | OUTPATIENT
Start: 2023-03-16 | End: 2023-03-16

## 2023-03-16 RX ORDER — BUTALBITAL, ACETAMINOPHEN AND CAFFEINE 300; 40; 50 MG/1; MG/1; MG/1
1 CAPSULE ORAL EVERY 4 HOURS PRN
Qty: 84 CAPSULE | Refills: 0 | Status: SHIPPED | OUTPATIENT
Start: 2023-03-16

## 2023-03-16 RX ORDER — IBUPROFEN 800 MG/1
TABLET ORAL
Status: COMPLETED
Start: 2023-03-16 | End: 2023-03-16

## 2023-03-16 RX ORDER — ONDANSETRON 4 MG/1
4 TABLET, FILM COATED ORAL EVERY 8 HOURS PRN
Qty: 30 TABLET | Refills: 0 | Status: SHIPPED | OUTPATIENT
Start: 2023-03-16

## 2023-03-16 RX ADMIN — IBUPROFEN 800 MG: 800 TABLET, FILM COATED ORAL at 20:10

## 2023-03-16 RX ADMIN — IBUPROFEN 800 MG: 800 TABLET ORAL at 20:10

## 2023-03-16 RX ADMIN — ONDANSETRON 4 MG: 4 TABLET, ORALLY DISINTEGRATING ORAL at 20:11

## 2023-03-16 ASSESSMENT — ENCOUNTER SYMPTOMS
EYE REDNESS: 0
VOMITING: 0
RHINORRHEA: 0
SORE THROAT: 0
NAUSEA: 1
EYE DISCHARGE: 0
DIARRHEA: 0
TROUBLE SWALLOWING: 0
SHORTNESS OF BREATH: 0
COUGH: 0

## 2023-03-16 ASSESSMENT — PAIN DESCRIPTION - LOCATION: LOCATION: HEAD

## 2023-03-16 ASSESSMENT — PAIN SCALES - GENERAL: PAINLEVEL_OUTOF10: 8

## 2023-03-16 ASSESSMENT — PAIN - FUNCTIONAL ASSESSMENT
PAIN_FUNCTIONAL_ASSESSMENT: ACTIVITIES ARE NOT PREVENTED
PAIN_FUNCTIONAL_ASSESSMENT: 0-10

## 2023-03-16 ASSESSMENT — PAIN DESCRIPTION - DESCRIPTORS: DESCRIPTORS: ACHING

## 2023-03-16 NOTE — Clinical Note
Jenelle Rushing was seen and treated in our emergency department on 3/16/2023. He may return to work on 03/18/2023. If you have any questions or concerns, please don't hesitate to call.       Lidia Cole, APRN - CNP

## 2023-03-17 LAB
FLUAV RNA RESP QL NAA+PROBE: NOT DETECTED
FLUBV RNA RESP QL NAA+PROBE: NOT DETECTED
SARS-COV-2 RNA RESP QL NAA+PROBE: NOT DETECTED

## 2023-03-17 NOTE — ED PROVIDER NOTES
[facility]      CHIEF COMPLAINT       Chief Complaint   Patient presents with    Headache       Nurses Notes reviewed and I agree except as noted in the HPI. HISTORY OF PRESENT ILLNESS   Zac Mallory is a 47 y.o. male who presents for 1 day history of severe headache and nausea. Patient states he has a pain in the back of his neck and feels like he might throw up. Patient is rating pain currently 8/10. He said he is also felt \"hot\" today. Denies vomiting, diarrhea, chest pain, shortness of breath, or cough. REVIEW OF SYSTEMS     Review of Systems   Constitutional:  Negative for chills, diaphoresis, fatigue and fever. HENT:  Negative for congestion, ear pain, rhinorrhea, sore throat and trouble swallowing. Eyes:  Negative for discharge and redness. Respiratory:  Negative for cough and shortness of breath. Cardiovascular:  Negative for chest pain. Gastrointestinal:  Positive for nausea. Negative for diarrhea and vomiting. Genitourinary:  Negative for decreased urine volume. Musculoskeletal:  Negative for neck pain and neck stiffness. Skin:  Negative for rash. Neurological:  Positive for headaches. Hematological:  Negative for adenopathy. Psychiatric/Behavioral:  Negative for sleep disturbance. PAST MEDICAL HISTORY         Diagnosis Date    High cholesterol     Low testosterone 2020    dr Flora Shelton     Sleep apnea        SURGICAL HISTORY     Patient  has no past surgical history on file. CURRENT MEDICATIONS       Previous Medications    CHOLECALCIFEROL (VITAMIN D3) 50 MCG (2000 UT) CAPS    take 1 capsule by mouth once daily    CPAP MACHINE MISC    by Does not apply route Please change CPAP pressure to auto 10-20 cm H20.     IBUPROFEN (IBU) 400 MG TABLET    Take 1 tablet by mouth every 6 hours as needed for Pain    NAPROXEN (NAPROSYN) 500 MG TABLET    Take 1 tablet by mouth 2 times daily    OMEPRAZOLE (PRILOSEC) 10 MG DELAYED RELEASE CAPSULE    Take 10 mg by mouth daily    ROSUVASTATIN (CRESTOR) 5 MG TABLET    take 1 tablet by mouth once daily    ZINC PO    Take by mouth daily        ALLERGIES     Patient is has No Known Allergies. FAMILY HISTORY     Patient'sfamily history includes High Blood Pressure in his mother. SOCIAL HISTORY     Patient  reports that he has never smoked. He has been exposed to tobacco smoke. He has never used smokeless tobacco. He reports that he does not drink alcohol and does not use drugs. PHYSICAL EXAM     ED TRIAGE VITALS  BP: (!) 149/81, Temp: 97 °F (36.1 °C), Heart Rate: 74,  , SpO2: 95 %  Physical Exam  Vitals and nursing note reviewed. Constitutional:       General: He is not in acute distress. Appearance: Normal appearance. He is obese. HENT:      Head: Normocephalic and atraumatic. Right Ear: Tympanic membrane normal.      Left Ear: Tympanic membrane normal.      Nose: Nose normal.      Mouth/Throat:      Mouth: Mucous membranes are moist.      Pharynx: Oropharynx is clear. Eyes:      Conjunctiva/sclera: Conjunctivae normal.   Cardiovascular:      Rate and Rhythm: Normal rate and regular rhythm. Heart sounds: Normal heart sounds. Pulmonary:      Effort: Pulmonary effort is normal. No respiratory distress. Breath sounds: Normal breath sounds. No wheezing. Abdominal:      General: Abdomen is flat. Bowel sounds are normal.      Palpations: Abdomen is soft. Musculoskeletal:         General: Normal range of motion. Cervical back: Normal range of motion and neck supple. Lymphadenopathy:      Cervical: No cervical adenopathy. Skin:     General: Skin is warm and dry. Capillary Refill: Capillary refill takes less than 2 seconds. Neurological:      General: No focal deficit present. Mental Status: He is alert and oriented to person, place, and time.    Psychiatric:         Mood and Affect: Mood normal.         Behavior: Behavior normal.         Thought Content: Thought content normal.         Judgment: Judgment normal.       DIAGNOSTIC RESULTS   Labs:No results found for this visit on 03/16/23.    IMAGING:  No orders to display      URGENT CARE COURSE:     Vitals:    03/16/23 1952   BP: (!) 149/81   Pulse: 74   Temp: 97 °F (36.1 °C)   TempSrc: Temporal   SpO2: 95%   Weight: 270 lb (122.5 kg)   Height: 5' 5\" (1.651 m)       Medications   ibuprofen (ADVIL;MOTRIN) 800 MG tablet (has no administration in time range)   ibuprofen (ADVIL;MOTRIN) tablet 800 mg (has no administration in time range)   ondansetron (ZOFRAN-ODT) disintegrating tablet 4 mg (has no administration in time range)     PROCEDURES:  None  FINAL IMPRESSION    No diagnosis found.    DISPOSITION/PLAN   DISPOSITION      We will do a send out PCR for COVID for patient and advised him that if this headache gets worse this evening then he is to proceed to the emergency room for a CT scan and further evaluation.  Patient will be discharged home with Fioricet prescription and Zofran prescription.  PATIENT REFERRED TO:  No follow-up provider specified.  DISCHARGE MEDICATIONS:  New Prescriptions    No medications on file     Current Discharge Medication List          ARIK Lawrence - ARIK Saenz CNP  03/16/23 2013

## 2023-03-17 NOTE — DISCHARGE INSTRUCTIONS
Take all medications as prescribed. Treat the symptoms by making sure you are drinking fluids and you are well-rested. You may take Tylenol or Motrin per package instructions, unless otherwise directed. Seek emergency medical treatment for fever >101.5 for 3 days, increased in headaches, unable to eat or urinate for 6 hours, increase in current symptoms or for new or worrisome symptoms.

## 2023-04-07 RX ORDER — CETIRIZINE HYDROCHLORIDE 10 MG/1
TABLET ORAL
Qty: 30 TABLET | Refills: 0 | OUTPATIENT
Start: 2023-04-07

## 2023-04-07 RX ORDER — FLUTICASONE PROPIONATE 50 MCG
SPRAY, SUSPENSION (ML) NASAL
Qty: 16 G | Refills: 0 | OUTPATIENT
Start: 2023-04-07

## 2023-04-26 ENCOUNTER — HOSPITAL ENCOUNTER (EMERGENCY)
Age: 53
Discharge: HOME | End: 2023-04-26
Payer: MEDICAID

## 2023-04-26 ENCOUNTER — APPOINTMENT (OUTPATIENT)
Dept: GENERAL RADIOLOGY | Age: 53
End: 2023-04-26
Payer: MEDICAID

## 2023-04-26 VITALS
HEART RATE: 80 BPM | TEMPERATURE: 97.3 F | HEIGHT: 65 IN | WEIGHT: 287 LBS | SYSTOLIC BLOOD PRESSURE: 122 MMHG | RESPIRATION RATE: 16 BRPM | OXYGEN SATURATION: 95 % | DIASTOLIC BLOOD PRESSURE: 67 MMHG | BODY MASS INDEX: 47.82 KG/M2

## 2023-04-26 DIAGNOSIS — M72.2 PLANTAR FASCIITIS: ICD-10-CM

## 2023-04-26 DIAGNOSIS — H57.12 LEFT EYE PAIN: Primary | ICD-10-CM

## 2023-04-26 DIAGNOSIS — L72.3 SEBACEOUS CYST: ICD-10-CM

## 2023-04-26 PROCEDURE — 73630 X-RAY EXAM OF FOOT: CPT

## 2023-04-26 PROCEDURE — 99213 OFFICE O/P EST LOW 20 MIN: CPT

## 2023-04-26 PROCEDURE — 2500000003 HC RX 250 WO HCPCS: Performed by: EMERGENCY MEDICINE

## 2023-04-26 PROCEDURE — 99213 OFFICE O/P EST LOW 20 MIN: CPT | Performed by: EMERGENCY MEDICINE

## 2023-04-26 RX ORDER — PROPARACAINE HYDROCHLORIDE 5 MG/ML
1 SOLUTION/ DROPS OPHTHALMIC ONCE
Status: COMPLETED | OUTPATIENT
Start: 2023-04-26 | End: 2023-04-26

## 2023-04-26 RX ORDER — NAPROXEN 500 MG/1
500 TABLET ORAL 2 TIMES DAILY WITH MEALS
Qty: 60 TABLET | Refills: 0 | Status: SHIPPED | OUTPATIENT
Start: 2023-04-26 | End: 2023-04-26 | Stop reason: SDUPTHER

## 2023-04-26 RX ORDER — NAPROXEN 500 MG/1
500 TABLET ORAL 2 TIMES DAILY WITH MEALS
Qty: 60 TABLET | Refills: 0 | Status: SHIPPED | OUTPATIENT
Start: 2023-04-26

## 2023-04-26 RX ADMIN — PROPARACAINE HYDROCHLORIDE 1 DROP: 5 SOLUTION/ DROPS OPHTHALMIC at 09:50

## 2023-04-26 ASSESSMENT — PAIN - FUNCTIONAL ASSESSMENT: PAIN_FUNCTIONAL_ASSESSMENT: 0-10

## 2023-04-26 ASSESSMENT — ENCOUNTER SYMPTOMS
RHINORRHEA: 0
SINUS PRESSURE: 0
COLOR CHANGE: 0
EYE DISCHARGE: 0
COUGH: 0
SINUS PAIN: 0
EYE PAIN: 1
PHOTOPHOBIA: 0
EYE REDNESS: 0

## 2023-04-26 ASSESSMENT — PAIN DESCRIPTION - LOCATION
LOCATION: LEG
LOCATION_2: EYE

## 2023-04-26 ASSESSMENT — PAIN DESCRIPTION - DESCRIPTORS
DESCRIPTORS_2: ACHING
DESCRIPTORS: ACHING

## 2023-04-26 ASSESSMENT — PAIN DESCRIPTION - ORIENTATION
ORIENTATION: RIGHT;LEFT
ORIENTATION_2: LEFT

## 2023-04-26 ASSESSMENT — PAIN DESCRIPTION - INTENSITY: RATING_2: 8

## 2023-04-26 ASSESSMENT — PAIN SCALES - GENERAL: PAINLEVEL_OUTOF10: 2

## 2023-04-26 ASSESSMENT — PAIN DESCRIPTION - PAIN TYPE: TYPE: ACUTE PAIN

## 2023-04-26 ASSESSMENT — VISUAL ACUITY: OU: 1

## 2023-04-26 NOTE — ED TRIAGE NOTES
Pt has bilateral lower leg pain and states there is a \"lump\" right lower anterior leg nickel size and small hard area noted left anterior lower leg. Pt states he noticed today. Pt c/o left eye pain and states he feels like there is \"something in it\". Denies exposure to anything in eye.

## 2023-04-26 NOTE — DISCHARGE INSTRUCTIONS
Try rolling your left foot on a frozen water bottle several times a day to help with your left foot pain    Naproxen twice daily. Take with food. This should help with your foot pain, your knee pain and also help with your eye pain    See an ophthalmologist or optometrist if the eye pain continues greater than 2 to 3 days. Return sooner for drainage from the eye or any visual disturbances, or redness of the eye    Follow-up with your primary care provider for reevaluation of your symptoms and possible further treatment.     I recommend you go and have your blood work done that was ordered by your primary care provider last year at your wellness exam.  Shari Gallegos should be seen by your primary care provider every year

## 2023-04-26 NOTE — ED PROVIDER NOTES
[facility]       CHIEF COMPLAINT       Chief Complaint   Patient presents with    Leg Pain     Pt has bilateral lower leg pain and states there is a \"lump\" right lower anterior leg nickel size and small hard area noted left anterior lower leg. Pt states he noticed today. Eye Problem     Pt c/o left eye pain and states he feels like there is \"something in it\". Denies exposure to anything in eye. Nurses Notes reviewed and I agree except as noted in the HPI. HISTORY OF PRESENT ILLNESS   Ronaldo Guzman is a 47 y.o. male who presents for multiple complaints. First complaint is a small cystic-like lesion to his right anterior shin. This is developed over the past couple of weeks. States it does not bother him but it is tender when he touches it or bumps it. He states he thinks he feels 1 developing on his left leg but could not reproduce it today. Second complaint is left eye pain. Patient states that he has a pain to the left corner of his eye. States it feels like there is something in it. He states he cannot see anything in it. This began today. No drainage from the eye. No visual disturbance. He states he wears CPAP machine and states this may have irritated his eye. Third complaint is left foot pain. States he has pain to the midfoot. This is mainly on the bottom of the foot but sometimes feels it on the top of the foot. This has been present for greater than 2 months. States walking increases his pain. He has not taken any medication for his pain. Patient also complains of bilateral knee pain and sometimes develops calf pain. Patient states he did go to a masseuse earlier in the week with some relief of this calf pain. He also states he had the same aches in his shoulders. HPI    REVIEW OF SYSTEMS     Review of Systems   Constitutional:  Negative for activity change, diaphoresis, fatigue and fever.    HENT:  Negative for

## 2023-05-16 ENCOUNTER — TELEPHONE (OUTPATIENT)
Dept: FAMILY MEDICINE CLINIC | Age: 53
End: 2023-05-16

## 2023-05-16 ENCOUNTER — NURSE ONLY (OUTPATIENT)
Dept: LAB | Age: 53
End: 2023-05-16

## 2023-05-16 DIAGNOSIS — Z00.00 ENCOUNTER FOR WELL ADULT EXAM WITHOUT ABNORMAL FINDINGS: ICD-10-CM

## 2023-05-16 DIAGNOSIS — E55.9 VITAMIN D DEFICIENCY: ICD-10-CM

## 2023-05-16 LAB
25(OH)D3 SERPL-MCNC: 26 NG/ML (ref 30–100)
ALBUMIN SERPL BCG-MCNC: 4.7 G/DL (ref 3.5–5.1)
ALP SERPL-CCNC: 73 U/L (ref 38–126)
ALT SERPL W/O P-5'-P-CCNC: 75 U/L (ref 11–66)
ANION GAP SERPL CALC-SCNC: 10 MEQ/L (ref 8–16)
AST SERPL-CCNC: 41 U/L (ref 5–40)
BASOPHILS ABSOLUTE: 0 THOU/MM3 (ref 0–0.1)
BASOPHILS NFR BLD AUTO: 0.6 %
BILIRUB CONJ SERPL-MCNC: < 0.2 MG/DL (ref 0–0.3)
BILIRUB SERPL-MCNC: 0.5 MG/DL (ref 0.3–1.2)
BUN SERPL-MCNC: 9 MG/DL (ref 7–22)
CALCIUM SERPL-MCNC: 9.5 MG/DL (ref 8.5–10.5)
CHLORIDE SERPL-SCNC: 105 MEQ/L (ref 98–111)
CHOLESTEROL, FASTING: 255 MG/DL (ref 100–199)
CO2 SERPL-SCNC: 27 MEQ/L (ref 23–33)
CREAT SERPL-MCNC: 1 MG/DL (ref 0.4–1.2)
DEPRECATED RDW RBC AUTO: 46.4 FL (ref 35–45)
EOSINOPHIL NFR BLD AUTO: 1.8 %
EOSINOPHILS ABSOLUTE: 0.1 THOU/MM3 (ref 0–0.4)
ERYTHROCYTE [DISTWIDTH] IN BLOOD BY AUTOMATED COUNT: 12.3 % (ref 11.5–14.5)
GFR SERPL CREATININE-BSD FRML MDRD: > 60 ML/MIN/1.73M2
GLUCOSE SERPL-MCNC: 117 MG/DL (ref 70–108)
HCT VFR BLD AUTO: 47 % (ref 42–52)
HDLC SERPL-MCNC: 37 MG/DL
HGB BLD-MCNC: 15.2 GM/DL (ref 14–18)
IMM GRANULOCYTES # BLD AUTO: 0.02 THOU/MM3 (ref 0–0.07)
IMM GRANULOCYTES NFR BLD AUTO: 0.4 %
LDLC SERPL CALC-MCNC: 188 MG/DL
LYMPHOCYTES ABSOLUTE: 2.8 THOU/MM3 (ref 1–4.8)
LYMPHOCYTES NFR BLD AUTO: 54 %
MCH RBC QN AUTO: 33 PG (ref 26–33)
MCHC RBC AUTO-ENTMCNC: 32.3 GM/DL (ref 32.2–35.5)
MCV RBC AUTO: 102 FL (ref 80–94)
MONOCYTES ABSOLUTE: 0.5 THOU/MM3 (ref 0.4–1.3)
MONOCYTES NFR BLD AUTO: 10 %
NEUTROPHILS NFR BLD AUTO: 33.2 %
NRBC BLD AUTO-RTO: 0 /100 WBC
PLATELET # BLD AUTO: 299 THOU/MM3 (ref 130–400)
PMV BLD AUTO: 9.1 FL (ref 9.4–12.4)
POTASSIUM SERPL-SCNC: 4.4 MEQ/L (ref 3.5–5.2)
PROT SERPL-MCNC: 7.5 G/DL (ref 6.1–8)
RBC # BLD AUTO: 4.61 MILL/MM3 (ref 4.7–6.1)
SEGMENTED NEUTROPHILS ABSOLUTE COUNT: 1.7 THOU/MM3 (ref 1.8–7.7)
SODIUM SERPL-SCNC: 142 MEQ/L (ref 135–145)
TRIGLYCERIDE, FASTING: 150 MG/DL (ref 0–199)
WBC # BLD AUTO: 5.1 THOU/MM3 (ref 4.8–10.8)

## 2023-05-16 NOTE — TELEPHONE ENCOUNTER
----- Message from ARIK Hayes CNP sent at 5/16/2023  1:10 PM EDT -----  Will review with patient at her upcoming appointment.

## 2023-05-25 ENCOUNTER — OFFICE VISIT (OUTPATIENT)
Dept: FAMILY MEDICINE CLINIC | Age: 53
End: 2023-05-25

## 2023-05-25 VITALS
SYSTOLIC BLOOD PRESSURE: 128 MMHG | TEMPERATURE: 98.1 F | WEIGHT: 282.8 LBS | RESPIRATION RATE: 16 BRPM | DIASTOLIC BLOOD PRESSURE: 80 MMHG | BODY MASS INDEX: 45.45 KG/M2 | OXYGEN SATURATION: 93 % | HEIGHT: 66 IN | HEART RATE: 80 BPM

## 2023-05-25 DIAGNOSIS — G89.29 CHRONIC BILATERAL LOW BACK PAIN WITHOUT SCIATICA: ICD-10-CM

## 2023-05-25 DIAGNOSIS — G47.33 OBSTRUCTIVE SLEEP APNEA ON CPAP: ICD-10-CM

## 2023-05-25 DIAGNOSIS — R74.8 ELEVATED LIVER ENZYMES: ICD-10-CM

## 2023-05-25 DIAGNOSIS — M54.50 CHRONIC BILATERAL LOW BACK PAIN WITHOUT SCIATICA: ICD-10-CM

## 2023-05-25 DIAGNOSIS — E55.9 VITAMIN D DEFICIENCY: ICD-10-CM

## 2023-05-25 DIAGNOSIS — Z00.00 ENCOUNTER FOR WELL ADULT EXAM WITHOUT ABNORMAL FINDINGS: Primary | ICD-10-CM

## 2023-05-25 DIAGNOSIS — Z99.89 OBSTRUCTIVE SLEEP APNEA ON CPAP: ICD-10-CM

## 2023-05-25 DIAGNOSIS — E78.2 MIXED HYPERLIPIDEMIA: ICD-10-CM

## 2023-05-25 DIAGNOSIS — R79.89 LOW SERUM VITAMIN D: ICD-10-CM

## 2023-05-25 DIAGNOSIS — R73.01 IFG (IMPAIRED FASTING GLUCOSE): ICD-10-CM

## 2023-05-25 DIAGNOSIS — R22.41 LEG MASS, RIGHT: ICD-10-CM

## 2023-05-25 DIAGNOSIS — E78.2 ELEVATED TRIGLYCERIDES WITH HIGH CHOLESTEROL: ICD-10-CM

## 2023-05-25 RX ORDER — ROSUVASTATIN CALCIUM 5 MG/1
TABLET, COATED ORAL
Qty: 90 TABLET | Refills: 3 | Status: CANCELLED | OUTPATIENT
Start: 2023-05-25

## 2023-05-25 RX ORDER — NAPROXEN 500 MG/1
500 TABLET ORAL 2 TIMES DAILY
Qty: 60 TABLET | Refills: 0 | Status: SHIPPED | OUTPATIENT
Start: 2023-05-25

## 2023-05-25 RX ORDER — ROSUVASTATIN CALCIUM 10 MG/1
10 TABLET, COATED ORAL DAILY
Qty: 90 TABLET | Refills: 3 | Status: SHIPPED | OUTPATIENT
Start: 2023-05-25

## 2023-05-25 RX ORDER — METFORMIN HYDROCHLORIDE 500 MG/1
500 TABLET, EXTENDED RELEASE ORAL
Qty: 90 TABLET | Refills: 1 | Status: SHIPPED | OUTPATIENT
Start: 2023-05-25

## 2023-05-25 RX ORDER — ACETAMINOPHEN 160 MG
TABLET,DISINTEGRATING ORAL
Qty: 90 CAPSULE | Refills: 3 | Status: SHIPPED | OUTPATIENT
Start: 2023-05-25

## 2023-05-25 SDOH — ECONOMIC STABILITY: HOUSING INSECURITY
IN THE LAST 12 MONTHS, WAS THERE A TIME WHEN YOU DID NOT HAVE A STEADY PLACE TO SLEEP OR SLEPT IN A SHELTER (INCLUDING NOW)?: NO

## 2023-05-25 SDOH — ECONOMIC STABILITY: FOOD INSECURITY: WITHIN THE PAST 12 MONTHS, THE FOOD YOU BOUGHT JUST DIDN'T LAST AND YOU DIDN'T HAVE MONEY TO GET MORE.: NEVER TRUE

## 2023-05-25 SDOH — ECONOMIC STABILITY: FOOD INSECURITY: WITHIN THE PAST 12 MONTHS, YOU WORRIED THAT YOUR FOOD WOULD RUN OUT BEFORE YOU GOT MONEY TO BUY MORE.: NEVER TRUE

## 2023-05-25 SDOH — ECONOMIC STABILITY: INCOME INSECURITY: HOW HARD IS IT FOR YOU TO PAY FOR THE VERY BASICS LIKE FOOD, HOUSING, MEDICAL CARE, AND HEATING?: NOT HARD AT ALL

## 2023-05-25 ASSESSMENT — ENCOUNTER SYMPTOMS
EYE DISCHARGE: 0
RHINORRHEA: 0
EYE PAIN: 0
WHEEZING: 0
VOMITING: 0
BACK PAIN: 0
TROUBLE SWALLOWING: 0
SHORTNESS OF BREATH: 0
COUGH: 0
ABDOMINAL PAIN: 0
DIARRHEA: 0
SORE THROAT: 0
CONSTIPATION: 0
NAUSEA: 0
EYE REDNESS: 0
ALLERGIC/IMMUNOLOGIC NEGATIVE: 1

## 2023-05-25 ASSESSMENT — PATIENT HEALTH QUESTIONNAIRE - PHQ9
SUM OF ALL RESPONSES TO PHQ QUESTIONS 1-9: 1
1. LITTLE INTEREST OR PLEASURE IN DOING THINGS: 0
SUM OF ALL RESPONSES TO PHQ9 QUESTIONS 1 & 2: 1
2. FEELING DOWN, DEPRESSED OR HOPELESS: 1
SUM OF ALL RESPONSES TO PHQ QUESTIONS 1-9: 1

## 2023-05-25 NOTE — PROGRESS NOTES
GCS motor subscore is 6. Cranial Nerves: No cranial nerve deficit. Coordination: Coordination normal.      Gait: Gait normal.   Psychiatric:         Mood and Affect: Mood is not anxious or depressed. Speech: Speech normal.         Behavior: Behavior normal. Behavior is not withdrawn or hyperactive. Behavior is cooperative. Thought Content: Thought content normal.         Judgment: Judgment normal.       Assessment   Plan   1. Encounter for well adult exam without abnormal findings  2. Mixed hyperlipidemia  -     rosuvastatin (CRESTOR) 10 MG tablet; Take 1 tablet by mouth daily, Disp-90 tablet, R-3Normal  3. Elevated liver enzymes  4. Low serum vitamin D  5. Elevated triglycerides with high cholesterol  6. Vitamin D deficiency  -     Cholecalciferol (VITAMIN D3) 50 MCG (2000 UT) CAPS; take 1 capsule by mouth once daily, Disp-90 capsule, R-3Normal  7. Leg mass, right  -     US EXTREMITY RIGHT NON VASC LIMITED; Future  8. Chronic bilateral low back pain without sciatica  -     naproxen (NAPROSYN) 500 MG tablet; Take 1 tablet by mouth 2 times daily, Disp-60 tablet, R-0Normal  9. IFG (impaired fasting glucose)  -     metFORMIN (GLUCOPHAGE-XR) 500 MG extended release tablet; Take 1 tablet by mouth daily (with breakfast), Disp-90 tablet, R-1Normal  10. Obstructive sleep apnea on CPAP     I discussed with patient the lab results and that most of these are likely attributable to his weight issue which is gotten worse this calendar year due to his job is sitting as a . Also discussed that his sleep apnea also plays a significant part in his overall health and inability to lose weight. Patient agrees to starting metformin and get back on his Crestor. Ultrasound ordered to evaluate the mass of his lower leg.     Personalized Preventive Plan   Current Health Maintenance Status  Immunization History   Administered Date(s) Administered    TDaP, ADACEL (age 10y-63y), BOOSTRIX (age 10y+),

## 2023-06-21 ENCOUNTER — HOSPITAL ENCOUNTER (OUTPATIENT)
Dept: ULTRASOUND IMAGING | Age: 53
Discharge: HOME | End: 2023-06-21
Payer: MEDICAID

## 2023-06-21 DIAGNOSIS — R22.41 LEG MASS, RIGHT: ICD-10-CM

## 2023-06-21 PROCEDURE — 76882 US LMTD JT/FCL EVL NVASC XTR: CPT

## 2023-10-07 ENCOUNTER — HOSPITAL ENCOUNTER (EMERGENCY)
Age: 53
Discharge: HOME | End: 2023-10-07
Payer: MEDICAID

## 2023-10-07 VITALS
OXYGEN SATURATION: 96 % | HEIGHT: 65 IN | TEMPERATURE: 98.4 F | HEART RATE: 69 BPM | SYSTOLIC BLOOD PRESSURE: 127 MMHG | RESPIRATION RATE: 18 BRPM | DIASTOLIC BLOOD PRESSURE: 74 MMHG | BODY MASS INDEX: 47.06 KG/M2

## 2023-10-07 DIAGNOSIS — K21.00 GASTROESOPHAGEAL REFLUX DISEASE WITH ESOPHAGITIS WITHOUT HEMORRHAGE: Primary | ICD-10-CM

## 2023-10-07 LAB
EKG ATRIAL RATE: 67 BPM
EKG P AXIS: 67 DEGREES
EKG P-R INTERVAL: 164 MS
EKG Q-T INTERVAL: 382 MS
EKG QRS DURATION: 88 MS
EKG QTC CALCULATION (BAZETT): 403 MS
EKG R AXIS: -10 DEGREES
EKG T AXIS: 30 DEGREES
EKG VENTRICULAR RATE: 67 BPM

## 2023-10-07 PROCEDURE — 6370000000 HC RX 637 (ALT 250 FOR IP): Performed by: NURSE PRACTITIONER

## 2023-10-07 PROCEDURE — 99213 OFFICE O/P EST LOW 20 MIN: CPT | Performed by: NURSE PRACTITIONER

## 2023-10-07 PROCEDURE — 93010 ELECTROCARDIOGRAM REPORT: CPT | Performed by: INTERNAL MEDICINE

## 2023-10-07 PROCEDURE — 93005 ELECTROCARDIOGRAM TRACING: CPT | Performed by: NURSE PRACTITIONER

## 2023-10-07 PROCEDURE — 99213 OFFICE O/P EST LOW 20 MIN: CPT

## 2023-10-07 RX ADMIN — ALUMINUM HYDROXIDE, MAGNESIUM HYDROXIDE, AND SIMETHICONE: 200; 200; 20 SUSPENSION ORAL at 12:55

## 2023-10-07 ASSESSMENT — ENCOUNTER SYMPTOMS
EYE DISCHARGE: 0
COUGH: 0
VOMITING: 0
EYE REDNESS: 0
SHORTNESS OF BREATH: 0
SORE THROAT: 0
TROUBLE SWALLOWING: 0
DIARRHEA: 0
NAUSEA: 0
RHINORRHEA: 0

## 2023-10-07 ASSESSMENT — PAIN - FUNCTIONAL ASSESSMENT: PAIN_FUNCTIONAL_ASSESSMENT: NONE - DENIES PAIN

## 2023-10-07 NOTE — ED NOTES
Pt with complaints of chest pain that started today while at a football game. States the pain is only when he moves his arms a certain way. Denies any pain at this time. Denies any shortness of breath, dizziness, numbness or blurry vision. States he has been burping more since pain started.      Pato Villa LPN  86/48/83 [id number]

## 2024-02-02 ENCOUNTER — HOSPITAL ENCOUNTER (EMERGENCY)
Age: 54
Discharge: HOME | End: 2024-02-02

## 2024-02-02 VITALS
OXYGEN SATURATION: 94 % | TEMPERATURE: 97.7 F | DIASTOLIC BLOOD PRESSURE: 96 MMHG | SYSTOLIC BLOOD PRESSURE: 143 MMHG | RESPIRATION RATE: 16 BRPM | HEART RATE: 94 BPM

## 2024-02-02 DIAGNOSIS — H65.02 NON-RECURRENT ACUTE SEROUS OTITIS MEDIA OF LEFT EAR: Primary | ICD-10-CM

## 2024-02-02 PROCEDURE — 99213 OFFICE O/P EST LOW 20 MIN: CPT | Performed by: NURSE PRACTITIONER

## 2024-02-02 PROCEDURE — 99213 OFFICE O/P EST LOW 20 MIN: CPT

## 2024-02-02 RX ORDER — PSEUDOEPHEDRINE HCL 30 MG
30 TABLET ORAL EVERY 4 HOURS PRN
Qty: 42 TABLET | Refills: 0 | Status: SHIPPED | OUTPATIENT
Start: 2024-02-02 | End: 2024-02-09

## 2024-02-02 RX ORDER — AMOXICILLIN 500 MG/1
500 CAPSULE ORAL 2 TIMES DAILY
Qty: 20 CAPSULE | Refills: 0 | Status: SHIPPED | OUTPATIENT
Start: 2024-02-02 | End: 2024-02-12

## 2024-02-02 ASSESSMENT — ENCOUNTER SYMPTOMS
SINUS PAIN: 0
VOMITING: 0
SINUS PRESSURE: 0
NAUSEA: 0
SHORTNESS OF BREATH: 0
COUGH: 0
SORE THROAT: 1

## 2024-02-02 NOTE — ED PROVIDER NOTES
[facility] URGENT CARE  Urgent Care Encounter       CHIEF COMPLAINT       Chief Complaint   Patient presents with    Headache     Onset  1 month ear pain and sore throat for same amt of time.\" Getting tired of dealing with it\"       Nurses Notes reviewed and I agree except as noted in the HPI.  HISTORY OF PRESENT ILLNESS   Rohit Vasques is a 53 y.o. male who presents with complaints of headache, ear pain, congestion, sore throat.  Patient reports symptoms being intermittent waxing and waning.  Reports posterior neck discomfort at times.  Denies any nausea or vomiting.  No reported fevers.  Has been taking vitamin C only.    The history is provided by the patient.       REVIEW OF SYSTEMS     Review of Systems   Constitutional:  Negative for fatigue and fever.   HENT:  Positive for congestion and sore throat. Negative for sinus pressure and sinus pain.    Respiratory:  Negative for cough and shortness of breath.    Cardiovascular:  Negative for chest pain.   Gastrointestinal:  Negative for nausea and vomiting.   Musculoskeletal:  Negative for myalgias.   Neurological:  Positive for headaches.       PAST MEDICAL HISTORY         Diagnosis Date    High cholesterol     Low testosterone 2020    dr elaine     Sleep apnea        SURGICALHISTORY     Patient  has no past surgical history on file.    CURRENT MEDICATIONS       Previous Medications    BUTALBITAL-APAP-CAFFEINE (FIORICET) -40 MG CAPS PER CAPSULE    Take 1 capsule by mouth every 4 hours as needed for Headaches Max Daily Amount: 6 capsules    CHOLECALCIFEROL (VITAMIN D3) 50 MCG (2000 UT) CAPS    take 1 capsule by mouth once daily    CPAP MACHINE MISC    by Does not apply route Please change CPAP pressure to auto 10-20 cm H20.    IBUPROFEN (IBU) 400 MG TABLET    Take 1 tablet by mouth every 6 hours as needed for Pain    METFORMIN (GLUCOPHAGE-XR) 500 MG EXTENDED RELEASE TABLET    Take 1 tablet by mouth daily (with breakfast)    NAPROXEN  (NAPROSYN) 500 MG TABLET    Take 1 tablet by mouth 2 times daily    OMEPRAZOLE (PRILOSEC) 10 MG DELAYED RELEASE CAPSULE    Take 1 capsule by mouth daily    ONDANSETRON (ZOFRAN) 4 MG TABLET    Take 1 tablet by mouth every 8 hours as needed for Nausea or Vomiting    ROSUVASTATIN (CRESTOR) 10 MG TABLET    Take 1 tablet by mouth daily    ZINC PO    Take by mouth daily        ALLERGIES     Patient is has No Known Allergies.    Patients   Immunization History   Administered Date(s) Administered    TDaP, ADACEL (age 10y-64y), BOOSTRIX (age 10y+), IM, 0.5mL 09/28/2017       FAMILY HISTORY     Patient's family history includes High Blood Pressure in his mother.    SOCIAL HISTORY     Patient  reports that he has never smoked. He has been exposed to tobacco smoke. He has never used smokeless tobacco. He reports that he does not drink alcohol and does not use drugs.    PHYSICAL EXAM     ED TRIAGE VITALS  BP: (!) 143/96, Temp: 97.7 °F (36.5 °C), Pulse: 94, Respirations: 16, SpO2: 94 %,Estimated body mass index is 47.06 kg/m² as calculated from the following:    Height as of 10/7/23: 1.651 m (5' 5\").    Weight as of 5/25/23: 128.3 kg (282 lb 12.8 oz).,No LMP for male patient.    Physical Exam  Vitals and nursing note reviewed.   Constitutional:       General: He is not in acute distress.     Appearance: He is not ill-appearing.   HENT:      Right Ear: Tympanic membrane, ear canal and external ear normal.      Left Ear: Ear canal and external ear normal.      Nose: Congestion present. No rhinorrhea.      Mouth/Throat:      Mouth: Mucous membranes are moist.      Pharynx: No posterior oropharyngeal erythema.   Cardiovascular:      Rate and Rhythm: Normal rate and regular rhythm.      Heart sounds: Normal heart sounds.   Pulmonary:      Effort: Pulmonary effort is normal.      Breath sounds: Normal breath sounds.   Lymphadenopathy:      Cervical: Cervical adenopathy present.   Skin:     General: Skin is warm and dry.   Neurological:

## 2024-09-28 ENCOUNTER — HOSPITAL ENCOUNTER (EMERGENCY)
Age: 54
Discharge: HOME | End: 2024-09-28
Payer: COMMERCIAL

## 2024-09-28 VITALS
OXYGEN SATURATION: 97 % | TEMPERATURE: 97.6 F | SYSTOLIC BLOOD PRESSURE: 129 MMHG | RESPIRATION RATE: 18 BRPM | HEART RATE: 87 BPM | DIASTOLIC BLOOD PRESSURE: 87 MMHG

## 2024-09-28 DIAGNOSIS — K21.9 GASTROESOPHAGEAL REFLUX DISEASE, UNSPECIFIED WHETHER ESOPHAGITIS PRESENT: Primary | ICD-10-CM

## 2024-09-28 DIAGNOSIS — Z71.1 FEARED COMPLAINT WITHOUT DIAGNOSIS: ICD-10-CM

## 2024-09-28 PROCEDURE — 99212 OFFICE O/P EST SF 10 MIN: CPT

## 2024-09-28 PROCEDURE — 99213 OFFICE O/P EST LOW 20 MIN: CPT

## 2024-09-28 ASSESSMENT — PAIN SCALES - GENERAL: PAINLEVEL_OUTOF10: 7

## 2024-09-28 ASSESSMENT — PAIN - FUNCTIONAL ASSESSMENT: PAIN_FUNCTIONAL_ASSESSMENT: 0-10

## 2024-09-28 ASSESSMENT — PAIN DESCRIPTION - LOCATION: LOCATION: CHEST

## 2024-09-28 ASSESSMENT — PAIN DESCRIPTION - DESCRIPTORS: DESCRIPTORS: TIGHTNESS

## 2024-09-28 NOTE — ED TRIAGE NOTES
Pt with complaints of a mass on the chest, sore throat and bilateral foot pain that started \"a while\" ago. Pt unable to say when issues started. Denies any injury to feet. Denies trying any medications or warm compresses for mass. States he feels as if sore throat is allergies but is unsure.

## 2024-09-28 NOTE — ED PROVIDER NOTES
[facility] URGENT CARE  Urgent Care Encounter       CHIEF COMPLAINT       Chief Complaint   Patient presents with    Mass     On chest    Pharyngitis    Foot Pain     monie       Nurses Notes reviewed and I agree except as noted in the HPI.  HISTORY OF PRESENT ILLNESS   Rohit Vasques is a 53 y.o. male who presents with concerns of a \"mass\" on his chest and a sore throat \"for some time\". Patient reports \"mass\" is painful \"if I press on it\", reports otherwise not bothersome. Patient denies any known injury to his chest. Patient reports sore throat only after eating certain foods. Patient states \"sometimes when I'm eating pizza, I feel like it might get stuck and then after awhile my throat still hurts\". Patient denies any episodes of choking or near choking episodes. Reports no use of medication for symptom management. Patient denies notifying PCP of these concerns.     HPI    REVIEW OF SYSTEMS     Review of Systems   HENT:  Positive for sore throat (with eating).    Musculoskeletal:         Mass in the middle of chest   All other systems reviewed and are negative.      PAST MEDICAL HISTORY         Diagnosis Date    High cholesterol     Low testosterone 2020    dr elaine     Sleep apnea        SURGICALHISTORY     Patient  has no past surgical history on file.    CURRENT MEDICATIONS       Discharge Medication List as of 9/28/2024  8:04 PM        CONTINUE these medications which have NOT CHANGED    Details   naproxen (NAPROSYN) 500 MG tablet Take 1 tablet by mouth 2 times daily, Disp-60 tablet, R-0Normal      Cholecalciferol (VITAMIN D3) 50 MCG (2000 UT) CAPS take 1 capsule by mouth once daily, Disp-90 capsule, R-3Normal      rosuvastatin (CRESTOR) 10 MG tablet Take 1 tablet by mouth daily, Disp-90 tablet, R-3Normal      metFORMIN (GLUCOPHAGE-XR) 500 MG extended release tablet Take 1 tablet by mouth daily (with breakfast), Disp-90 tablet, R-1Normal      omeprazole (PRILOSEC) 10 MG delayed release capsule

## 2024-09-29 NOTE — DISCHARGE INSTRUCTIONS
Trial OCT Zyrtec.   Increase water intake, frequent hand washing.  Tylenol / Ibuprofen as needed for fever and or pain.  Follow up with PCP Monday.

## 2024-11-25 SDOH — ECONOMIC STABILITY: INCOME INSECURITY: HOW HARD IS IT FOR YOU TO PAY FOR THE VERY BASICS LIKE FOOD, HOUSING, MEDICAL CARE, AND HEATING?: HARD

## 2024-11-25 SDOH — ECONOMIC STABILITY: FOOD INSECURITY: WITHIN THE PAST 12 MONTHS, YOU WORRIED THAT YOUR FOOD WOULD RUN OUT BEFORE YOU GOT MONEY TO BUY MORE.: SOMETIMES TRUE

## 2024-11-25 SDOH — ECONOMIC STABILITY: FOOD INSECURITY: WITHIN THE PAST 12 MONTHS, THE FOOD YOU BOUGHT JUST DIDN'T LAST AND YOU DIDN'T HAVE MONEY TO GET MORE.: NEVER TRUE

## 2024-11-25 ASSESSMENT — PATIENT HEALTH QUESTIONNAIRE - PHQ9
SUM OF ALL RESPONSES TO PHQ9 QUESTIONS 1 & 2: 0
SUM OF ALL RESPONSES TO PHQ QUESTIONS 1-9: 0
2. FEELING DOWN, DEPRESSED OR HOPELESS: NOT AT ALL
SUM OF ALL RESPONSES TO PHQ QUESTIONS 1-9: 0
1. LITTLE INTEREST OR PLEASURE IN DOING THINGS: NOT AT ALL
2. FEELING DOWN, DEPRESSED OR HOPELESS: NOT AT ALL
1. LITTLE INTEREST OR PLEASURE IN DOING THINGS: NOT AT ALL
SUM OF ALL RESPONSES TO PHQ QUESTIONS 1-9: 0
SUM OF ALL RESPONSES TO PHQ9 QUESTIONS 1 & 2: 0
SUM OF ALL RESPONSES TO PHQ QUESTIONS 1-9: 0

## 2024-11-26 ENCOUNTER — OFFICE VISIT (OUTPATIENT)
Dept: FAMILY MEDICINE CLINIC | Age: 54
End: 2024-11-26
Payer: COMMERCIAL

## 2024-11-26 VITALS
RESPIRATION RATE: 12 BRPM | DIASTOLIC BLOOD PRESSURE: 68 MMHG | WEIGHT: 283 LBS | HEIGHT: 65 IN | HEART RATE: 76 BPM | BODY MASS INDEX: 47.15 KG/M2 | SYSTOLIC BLOOD PRESSURE: 106 MMHG

## 2024-11-26 DIAGNOSIS — E66.811 CLASS 1 OBESITY WITH SERIOUS COMORBIDITY IN ADULT, UNSPECIFIED BMI, UNSPECIFIED OBESITY TYPE: ICD-10-CM

## 2024-11-26 DIAGNOSIS — R13.10 DYSPHAGIA, UNSPECIFIED TYPE: ICD-10-CM

## 2024-11-26 DIAGNOSIS — K21.9 GASTROESOPHAGEAL REFLUX DISEASE, UNSPECIFIED WHETHER ESOPHAGITIS PRESENT: ICD-10-CM

## 2024-11-26 DIAGNOSIS — Z00.00 ANNUAL PHYSICAL EXAM: Primary | ICD-10-CM

## 2024-11-26 DIAGNOSIS — M62.08 DIASTASIS RECTI: ICD-10-CM

## 2024-11-26 DIAGNOSIS — E55.9 VITAMIN D DEFICIENCY: ICD-10-CM

## 2024-11-26 DIAGNOSIS — Z13.1 ENCOUNTER FOR SCREENING FOR DIABETES MELLITUS: ICD-10-CM

## 2024-11-26 DIAGNOSIS — E78.2 MIXED HYPERLIPIDEMIA: ICD-10-CM

## 2024-11-26 PROCEDURE — 99396 PREV VISIT EST AGE 40-64: CPT | Performed by: NURSE PRACTITIONER

## 2024-11-26 RX ORDER — ACETAMINOPHEN 160 MG
TABLET,DISINTEGRATING ORAL
Qty: 90 CAPSULE | Refills: 3 | Status: SHIPPED | OUTPATIENT
Start: 2024-11-26

## 2024-11-26 RX ORDER — OMEPRAZOLE 10 MG/1
10 CAPSULE, DELAYED RELEASE ORAL DAILY
Qty: 90 CAPSULE | Refills: 3 | Status: SHIPPED | OUTPATIENT
Start: 2024-11-26 | End: 2025-11-21

## 2024-11-26 RX ORDER — OMEPRAZOLE 10 MG/1
10 CAPSULE, DELAYED RELEASE ORAL DAILY
Qty: 90 CAPSULE | Refills: 3 | Status: SHIPPED | OUTPATIENT
Start: 2024-11-26 | End: 2024-11-26 | Stop reason: SDUPTHER

## 2024-11-26 RX ORDER — ROSUVASTATIN CALCIUM 10 MG/1
10 TABLET, COATED ORAL DAILY
Qty: 90 TABLET | Refills: 3 | Status: SHIPPED | OUTPATIENT
Start: 2024-11-26

## 2024-11-26 NOTE — PATIENT INSTRUCTIONS
[place] [employer]*  (Call [employer]/211 if need more resources.)        Home Delivered Meals:  [employer] Delivered Meals:  Phone: 1-466.653.2612  [facility] ([place] only):  Phone: [phone]  Moms Meals:  What they offer: Nutritious refrigerated meals. Available to individuals with Medicare advantage plans, Medicaid plans, Long-Term services and supports (LTSS) programs and self-pay at $6.99 a meal.  [employer]al-Friendly, pureed and Gluten free at $7.99 a meal plus flat rate shipping.   Phone: 1-373.331.5904  Website: https://www.[employer]    Senior Nutrition program:  Phone: 1-497.894.3955  [employer] Home Delivered Meals (From [place]):  What they offer: Meals for seniors, disabled, recovering from an injury or illness on [place] and [facility]. Both privately and through homecare services like [facility] and [facility].   Phone: 1-556.850.8829  Website: https://M[employer]    Other Food Resources:  MOW (Meals on Wheels) Contact Area Agency on Aging   Phone: 150.128.9626    [facility]- Nutrition Program   What they offer: Nutrition education and nutritious foods for Pregnant women, women who just had a baby, breastfeeding moms, infants and children up to the age of 5.   Phone: 438.790.2638   Website: https://www.Monitor My Meds.org  [facility]- Provides Meals  What they offer: Provide food boxes to those in need monthly.  Phone: 431.911.7003  Website: https://[facility].org/ministries/[facility]  [employer]-Food/ Clothing   What they offer:  Provides a 3-day supply of food for individuals once a month.   Phone: 856.769.9026  Website: https://Roomorama.org/author/mikaacup1  [employer]-Meal Site   Phone: 440.764.4748  [place] [employer]- Meal Site  Phone: 1-765.296.5137  [employer]:  What they offer: Provides continental breakfast and hot lunch   Phone: 867-792-2304  Website: https://odbread.org

## 2024-11-26 NOTE — PROGRESS NOTES
[facility] PROFESSIONAL [facility]  [address] [id number]  Dept: [phone]  Dept Fax: [phone]  Loc: [phone]     Visit Date:  11/26/2024      Patient:  Rohit Vasques  YOB: 1970    HPI:     Chief Complaint   Patient presents with    6 Month Follow-Up     He has a bump around his sternum and noticed a lump around that area when he does sit ups. He does have slight pain with it.        Patient with a large abdominal protrusion midline that is worsened over the last year.  Also complains about difficulty with swallowing food and has to drink water most of the time when swallowing.  Sometimes it will improve after eating for a while but this is generally most of the time.        Medications    Current Outpatient Medications:     rosuvastatin (CRESTOR) 10 MG tablet, Take 1 tablet by mouth daily, Disp: 90 tablet, Rfl: 3    vitamin D (VITAMIN D3) 50 MCG (2000 UT) CAPS capsule, take 1 capsule by mouth once daily, Disp: 90 capsule, Rfl: 3    omeprazole (PRILOSEC) 10 MG delayed release capsule, Take 1 capsule by mouth daily, Disp: 90 capsule, Rfl: 3    ZINC PO, Take by mouth daily , Disp: , Rfl:     The patient has No Known Allergies.    Past Medical History  Rohit  has a past medical history of High cholesterol, Low testosterone, and Sleep apnea.    Subjective:      Review of Systems    Objective:     /68   Pulse 76   Resp 12   Ht 1.657 m (5' 5.25\")   Wt 128.4 kg (283 lb)   BMI 46.73 kg/m²     Physical Exam    Assessment/Plan:      Rohit \"Sampson\" was seen today for 6 month follow-up.    Diagnoses and all orders for this visit:    Annual physical exam  -     Basic Metabolic Panel; Future  -     Lipid, Fasting; Future  -     Hepatic Function Panel; Future  -     CBC with Auto Differential; Future    Mixed hyperlipidemia  -     rosuvastatin (CRESTOR) 10 MG tablet; Take 1 tablet by mouth daily    Dysphagia, unspecified type  -

## 2024-12-05 ENCOUNTER — LAB (OUTPATIENT)
Dept: LAB | Age: 54
End: 2024-12-05

## 2024-12-05 DIAGNOSIS — E66.811 CLASS 1 OBESITY WITH SERIOUS COMORBIDITY IN ADULT, UNSPECIFIED BMI, UNSPECIFIED OBESITY TYPE: ICD-10-CM

## 2024-12-05 DIAGNOSIS — Z00.00 ANNUAL PHYSICAL EXAM: ICD-10-CM

## 2024-12-05 LAB
ALBUMIN SERPL BCG-MCNC: 4.4 G/DL (ref 3.5–5.1)
ALP SERPL-CCNC: 69 U/L (ref 38–126)
ALT SERPL W/O P-5'-P-CCNC: 25 U/L (ref 11–66)
ANION GAP SERPL CALC-SCNC: 10 MEQ/L (ref 8–16)
AST SERPL-CCNC: 18 U/L (ref 5–40)
BASOPHILS ABSOLUTE: 0 THOU/MM3 (ref 0–0.1)
BASOPHILS NFR BLD AUTO: 0.4 %
BILIRUB CONJ SERPL-MCNC: < 0.1 MG/DL (ref 0.1–13.8)
BILIRUB SERPL-MCNC: 0.4 MG/DL (ref 0.3–1.2)
BUN SERPL-MCNC: 14 MG/DL (ref 7–22)
CALCIUM SERPL-MCNC: 9.4 MG/DL (ref 8.5–10.5)
CHLORIDE SERPL-SCNC: 103 MEQ/L (ref 98–111)
CHOLESTEROL, FASTING: 240 MG/DL (ref 100–199)
CO2 SERPL-SCNC: 27 MEQ/L (ref 23–33)
CREAT SERPL-MCNC: 1.2 MG/DL (ref 0.4–1.2)
DEPRECATED MEAN GLUCOSE BLD GHB EST-ACNC: 105 MG/DL (ref 70–126)
DEPRECATED RDW RBC AUTO: 45.4 FL (ref 35–45)
EOSINOPHIL NFR BLD AUTO: 1.1 %
EOSINOPHILS ABSOLUTE: 0.1 THOU/MM3 (ref 0–0.4)
ERYTHROCYTE [DISTWIDTH] IN BLOOD BY AUTOMATED COUNT: 12.1 % (ref 11.5–14.5)
GFR SERPL CREATININE-BSD FRML MDRD: 71 ML/MIN/1.73M2
GLUCOSE SERPL-MCNC: 110 MG/DL (ref 70–108)
HBA1C MFR BLD HPLC: 5.5 % (ref 4.4–6.4)
HCT VFR BLD AUTO: 47.3 % (ref 42–52)
HDLC SERPL-MCNC: 40 MG/DL
HGB BLD-MCNC: 15.4 GM/DL (ref 14–18)
IMM GRANULOCYTES # BLD AUTO: 0.01 THOU/MM3 (ref 0–0.07)
IMM GRANULOCYTES NFR BLD AUTO: 0.2 %
LDLC SERPL CALC-MCNC: 167 MG/DL
LYMPHOCYTES ABSOLUTE: 2.1 THOU/MM3 (ref 1–4.8)
LYMPHOCYTES NFR BLD AUTO: 46.3 %
MCH RBC QN AUTO: 32.6 PG (ref 26–33)
MCHC RBC AUTO-ENTMCNC: 32.6 GM/DL (ref 32.2–35.5)
MCV RBC AUTO: 100.2 FL (ref 80–94)
MONOCYTES ABSOLUTE: 0.6 THOU/MM3 (ref 0.4–1.3)
MONOCYTES NFR BLD AUTO: 14.1 %
NEUTROPHILS ABSOLUTE: 1.7 THOU/MM3 (ref 1.8–7.7)
NEUTROPHILS NFR BLD AUTO: 37.9 %
NRBC BLD AUTO-RTO: 0 /100 WBC
PLATELET # BLD AUTO: 287 THOU/MM3 (ref 130–400)
PMV BLD AUTO: 8.7 FL (ref 9.4–12.4)
POTASSIUM SERPL-SCNC: 4.4 MEQ/L (ref 3.5–5.2)
PROT SERPL-MCNC: 7.5 G/DL (ref 6.1–8)
RBC # BLD AUTO: 4.72 MILL/MM3 (ref 4.7–6.1)
SODIUM SERPL-SCNC: 140 MEQ/L (ref 135–145)
TRIGLYCERIDE, FASTING: 164 MG/DL (ref 0–199)
WBC # BLD AUTO: 4.6 THOU/MM3 (ref 4.8–10.8)

## 2024-12-06 ENCOUNTER — TELEPHONE (OUTPATIENT)
Dept: FAMILY MEDICINE CLINIC | Age: 54
End: 2024-12-06

## 2024-12-09 ENCOUNTER — OFFICE VISIT (OUTPATIENT)
Dept: FAMILY MEDICINE CLINIC | Age: 54
End: 2024-12-09
Payer: COMMERCIAL

## 2024-12-09 ENCOUNTER — OFFICE VISIT (OUTPATIENT)
Dept: SURGERY | Age: 54
End: 2024-12-09
Payer: COMMERCIAL

## 2024-12-09 VITALS
TEMPERATURE: 97.3 F | OXYGEN SATURATION: 97 % | HEART RATE: 76 BPM | SYSTOLIC BLOOD PRESSURE: 132 MMHG | HEIGHT: 65 IN | BODY MASS INDEX: 47.32 KG/M2 | RESPIRATION RATE: 18 BRPM | WEIGHT: 284 LBS | DIASTOLIC BLOOD PRESSURE: 84 MMHG

## 2024-12-09 VITALS
SYSTOLIC BLOOD PRESSURE: 110 MMHG | RESPIRATION RATE: 16 BRPM | TEMPERATURE: 98 F | HEART RATE: 72 BPM | WEIGHT: 286 LBS | DIASTOLIC BLOOD PRESSURE: 60 MMHG | BODY MASS INDEX: 47.59 KG/M2

## 2024-12-09 DIAGNOSIS — R73.01 IFG (IMPAIRED FASTING GLUCOSE): ICD-10-CM

## 2024-12-09 DIAGNOSIS — M62.08 DIASTASIS RECTI: ICD-10-CM

## 2024-12-09 DIAGNOSIS — E66.01 MORBID OBESITY WITH BMI OF 45.0-49.9, ADULT: Primary | ICD-10-CM

## 2024-12-09 DIAGNOSIS — E78.2 MIXED HYPERLIPIDEMIA: Primary | ICD-10-CM

## 2024-12-09 DIAGNOSIS — G47.33 OBSTRUCTIVE SLEEP APNEA ON CPAP: ICD-10-CM

## 2024-12-09 PROCEDURE — 99214 OFFICE O/P EST MOD 30 MIN: CPT | Performed by: NURSE PRACTITIONER

## 2024-12-09 PROCEDURE — 99202 OFFICE O/P NEW SF 15 MIN: CPT | Performed by: SURGERY

## 2024-12-09 RX ORDER — ALOGLIPTIN 6.25 MG/1
6.25 TABLET, FILM COATED ORAL DAILY
Qty: 90 TABLET | Refills: 0 | Status: SHIPPED | OUTPATIENT
Start: 2024-12-09 | End: 2025-03-09

## 2024-12-09 ASSESSMENT — ENCOUNTER SYMPTOMS
ABDOMINAL PAIN: 0
EYE PAIN: 0
EYE DISCHARGE: 0
ABDOMINAL PAIN: 0
RECTAL PAIN: 0
BLOOD IN STOOL: 0
EYE REDNESS: 0
EYE ITCHING: 0
EYE REDNESS: 0
VOMITING: 0
COUGH: 0
CHEST TIGHTNESS: 0
VOICE CHANGE: 0
CHOKING: 0
APNEA: 0
EYE DISCHARGE: 0
EYE PAIN: 0
CONSTIPATION: 0
BACK PAIN: 0
DIARRHEA: 0
WHEEZING: 0
SHORTNESS OF BREATH: 0
TROUBLE SWALLOWING: 0
SORE THROAT: 0
SHORTNESS OF BREATH: 0
NAUSEA: 0
STRIDOR: 0
SINUS PAIN: 0
PHOTOPHOBIA: 0
COUGH: 0
ANAL BLEEDING: 0
TROUBLE SWALLOWING: 0
FACIAL SWELLING: 0
SINUS PRESSURE: 0
ABDOMINAL DISTENTION: 0
RHINORRHEA: 0
DIARRHEA: 0
SORE THROAT: 0
RHINORRHEA: 0
COLOR CHANGE: 0
VOMITING: 0
NAUSEA: 0
BACK PAIN: 0
CONSTIPATION: 0
WHEEZING: 0
ALLERGIC/IMMUNOLOGIC NEGATIVE: 1

## 2024-12-09 NOTE — PROGRESS NOTES
[facility]      Dennis Brooke MD [facility]  General Surgery  New Patient Evaluation in Office  Pt Name: Rohit Vasques  Date of Birth 1970   Today's Date: 12/9/2024  Medical Record Number: [id number]  Referring Provider: Rodolfo Stephen APRN -*  Primary Care Provider: Rodolfo Stephen APRN - CNP  Chief Complaint   Patient presents with    Surgical Consult     NP refer T. Hailee, CNP-Lump near sternum     ASSESSMENT      Problem List Items Addressed This Visit       Morbid obesity with BMI of 45.0-49.9, adult - Primary    Obstructive sleep apnea on CPAP    Diastasis recti     There are no active hospital problems to display for this patient.        PLANS   Assessment & Plan   Schedule Rohit for nothing.  He has a very typical diastases rectus and a large abdomen  Looking back at CT scan from 2017 he had a diastases starting at that time already.  He has a BMI of 47 and I did discuss with him that the etiology of this is increasing abdominal girth and the rectus muscles cannot go directly over this increasing size and they move to the side in order to reach down to the pubic bone.  I also told him that from a health standpoint he would benefit from losing intra-abdominal fat because that is worse for men is for women and that may also help his diastases  No orders of the defined types were placed in this encounter.          KODY Bee is a 54 y.o. male seen in the office for evaluation of a bulge in the upper abdomen that sounds like diastases recti.  Looking in old records he had a CT scan abdomen and pelvis in 2017 and he had a diastases at that time.  From a symptom standpoint it does not really bother him but he knows that he is trying to do some sit ups he saw this bulge and that he was worried that maybe something was wrong.  Past Medical History  Past Medical History:   Diagnosis Date    High cholesterol     Low testosterone 2020    dr elaine     Sleep apnea      Past

## 2024-12-09 NOTE — PROGRESS NOTES
[facility] PROFESSIONAL [facility]  [address] [address]  Dept: [phone]  Dept Fax: [phone]  Loc: [phone]     Visit Date:  12/9/2024      Patient:  Rohit Vasques  YOB: 1970    HPI:     Chief Complaint   Patient presents with    Discuss Labs       Patient here to discuss recent lab work which showed elevated glucose and lipids.        Medications    Current Outpatient Medications:     alogliptin (NESINA) 6.25 MG TABS tablet, Take 1 tablet by mouth daily, Disp: 90 tablet, Rfl: 0    rosuvastatin (CRESTOR) 10 MG tablet, Take 1 tablet by mouth daily, Disp: 90 tablet, Rfl: 3    vitamin D (VITAMIN D3) 50 MCG (2000 UT) CAPS capsule, take 1 capsule by mouth once daily, Disp: 90 capsule, Rfl: 3    omeprazole (PRILOSEC) 10 MG delayed release capsule, Take 1 capsule by mouth daily, Disp: 90 capsule, Rfl: 3    ZINC PO, Take by mouth daily , Disp: , Rfl:     The patient has No Known Allergies.    Past Medical History  Rohit  has a past medical history of High cholesterol, Low testosterone, and Sleep apnea.    Subjective:      Review of Systems   Constitutional:  Negative for activity change, fatigue and fever.   HENT:  Negative for congestion, ear pain, rhinorrhea, sore throat and trouble swallowing.    Eyes:  Negative for pain, discharge and redness.   Respiratory:  Negative for cough, shortness of breath and wheezing.    Cardiovascular: Negative.    Gastrointestinal:  Negative for abdominal pain, constipation, diarrhea, nausea and vomiting.   Endocrine: Negative.    Genitourinary:  Negative for dysuria, frequency and urgency.   Musculoskeletal:  Negative for arthralgias, back pain and myalgias.   Skin:  Negative for rash.   Allergic/Immunologic: Negative.    Neurological:  Negative for dizziness, tremors, weakness and headaches.   Hematological: Negative.    Psychiatric/Behavioral:  Negative for dysphoric mood and sleep disturbance. The

## 2024-12-09 NOTE — PROGRESS NOTES
Subjective   Patient ID: Rohit Vasques is a 54 y.o. male.    HPI    Review of Systems   Constitutional:  Negative for activity change, appetite change, chills, diaphoresis, fatigue, fever and unexpected weight change.   HENT: Negative.  Negative for congestion, dental problem, drooling, ear discharge, ear pain, facial swelling, hearing loss, mouth sores, nosebleeds, postnasal drip, rhinorrhea, sinus pressure, sinus pain, sneezing, sore throat, tinnitus, trouble swallowing and voice change.    Eyes:  Negative for photophobia, pain, discharge, redness, itching and visual disturbance.   Respiratory:  Negative for apnea, cough, choking, chest tightness, shortness of breath, wheezing and stridor.    Cardiovascular:  Negative for chest pain, palpitations and leg swelling.   Gastrointestinal:  Negative for abdominal distention, abdominal pain, anal bleeding, blood in stool, constipation, diarrhea, nausea, rectal pain and vomiting.   Endocrine: Negative for cold intolerance, heat intolerance, polydipsia, polyphagia and polyuria.   Genitourinary:  Negative for decreased urine volume, difficulty urinating, dysuria, enuresis, flank pain, frequency, genital sores, hematuria, penile discharge, penile pain, penile swelling, scrotal swelling, testicular pain and urgency.   Musculoskeletal:  Negative for arthralgias, back pain, gait problem, joint swelling, myalgias, neck pain and neck stiffness.   Skin:  Negative for color change, pallor, rash and wound.   Allergic/Immunologic: Negative for environmental allergies, food allergies and immunocompromised state.   Neurological:  Negative for dizziness, tremors, seizures, syncope, facial asymmetry, speech difficulty, weakness, light-headedness, numbness and headaches.   Hematological:  Negative for adenopathy. Does not bruise/bleed easily.   Psychiatric/Behavioral:  Negative for agitation, behavioral problems, confusion, decreased concentration, dysphoric mood, hallucinations,

## 2024-12-11 ENCOUNTER — TELEPHONE (OUTPATIENT)
Dept: FAMILY MEDICINE CLINIC | Age: 54
End: 2024-12-11

## 2024-12-11 NOTE — TELEPHONE ENCOUNTER
[place] Medicaid has denied Nesina, looks like they may cover the following:    Glimepiride  Glipizide  Glipizide ER  Glipizide Metformin   Jardiance  Metformin ER  Pioglitazone    Please advise.

## 2025-08-02 DIAGNOSIS — R73.01 IFG (IMPAIRED FASTING GLUCOSE): ICD-10-CM

## 2025-08-04 RX ORDER — EMPAGLIFLOZIN 10 MG/1
10 TABLET, FILM COATED ORAL DAILY
Qty: 90 TABLET | Refills: 2 | Status: SHIPPED | OUTPATIENT
Start: 2025-08-04

## 2025-09-03 ENCOUNTER — OFFICE VISIT (OUTPATIENT)
Dept: FAMILY MEDICINE CLINIC | Age: 55
End: 2025-09-03
Payer: COMMERCIAL

## 2025-09-03 VITALS
HEART RATE: 70 BPM | OXYGEN SATURATION: 94 % | WEIGHT: 294.3 LBS | TEMPERATURE: 98.1 F | DIASTOLIC BLOOD PRESSURE: 74 MMHG | BODY MASS INDEX: 49.03 KG/M2 | SYSTOLIC BLOOD PRESSURE: 112 MMHG | RESPIRATION RATE: 16 BRPM | HEIGHT: 65 IN

## 2025-09-03 DIAGNOSIS — M77.01 MEDIAL EPICONDYLITIS OF RIGHT ELBOW: ICD-10-CM

## 2025-09-03 DIAGNOSIS — G89.29 CHRONIC PAIN OF RIGHT KNEE: ICD-10-CM

## 2025-09-03 DIAGNOSIS — M25.552 BILATERAL HIP PAIN: Primary | ICD-10-CM

## 2025-09-03 DIAGNOSIS — M25.551 BILATERAL HIP PAIN: Primary | ICD-10-CM

## 2025-09-03 DIAGNOSIS — M25.561 CHRONIC PAIN OF RIGHT KNEE: ICD-10-CM

## 2025-09-03 DIAGNOSIS — M23.51 CHRONIC INSTABILITY OF RIGHT KNEE: ICD-10-CM

## 2025-09-03 PROCEDURE — 99214 OFFICE O/P EST MOD 30 MIN: CPT | Performed by: NURSE PRACTITIONER

## 2025-09-03 RX ORDER — PREDNISONE 10 MG/1
TABLET ORAL
Qty: 30 TABLET | Refills: 2 | Status: SHIPPED | OUTPATIENT
Start: 2025-09-03 | End: 2025-09-13

## 2025-09-03 RX ORDER — MELOXICAM 15 MG/1
15 TABLET ORAL DAILY
Qty: 30 TABLET | Refills: 3 | Status: SHIPPED | OUTPATIENT
Start: 2025-09-03

## 2025-09-03 SDOH — ECONOMIC STABILITY: FOOD INSECURITY: WITHIN THE PAST 12 MONTHS, YOU WORRIED THAT YOUR FOOD WOULD RUN OUT BEFORE YOU GOT MONEY TO BUY MORE.: NEVER TRUE

## 2025-09-03 SDOH — ECONOMIC STABILITY: FOOD INSECURITY: WITHIN THE PAST 12 MONTHS, THE FOOD YOU BOUGHT JUST DIDN'T LAST AND YOU DIDN'T HAVE MONEY TO GET MORE.: NEVER TRUE

## 2025-09-03 ASSESSMENT — PATIENT HEALTH QUESTIONNAIRE - PHQ9
SUM OF ALL RESPONSES TO PHQ QUESTIONS 1-9: 0
1. LITTLE INTEREST OR PLEASURE IN DOING THINGS: NOT AT ALL
2. FEELING DOWN, DEPRESSED OR HOPELESS: NOT AT ALL

## 2025-09-04 ASSESSMENT — ENCOUNTER SYMPTOMS
COUGH: 0
BACK PAIN: 0
CHEST TIGHTNESS: 0
SHORTNESS OF BREATH: 0
ABDOMINAL DISTENTION: 0
ABDOMINAL PAIN: 0
WHEEZING: 0